# Patient Record
Sex: FEMALE | Race: WHITE | Employment: OTHER | URBAN - NONMETROPOLITAN AREA
[De-identification: names, ages, dates, MRNs, and addresses within clinical notes are randomized per-mention and may not be internally consistent; named-entity substitution may affect disease eponyms.]

---

## 2017-01-04 ENCOUNTER — APPOINTMENT (OUTPATIENT)
Dept: LAB | Age: 52
End: 2017-01-04
Attending: FAMILY MEDICINE
Payer: MEDICARE

## 2017-01-04 ENCOUNTER — OFFICE VISIT (OUTPATIENT)
Dept: FAMILY MEDICINE CLINIC | Facility: CLINIC | Age: 52
End: 2017-01-04

## 2017-01-04 VITALS
OXYGEN SATURATION: 97 % | TEMPERATURE: 98 F | DIASTOLIC BLOOD PRESSURE: 58 MMHG | HEART RATE: 70 BPM | SYSTOLIC BLOOD PRESSURE: 110 MMHG

## 2017-01-04 DIAGNOSIS — Z79.4 UNCONTROLLED TYPE 2 DIABETES MELLITUS WITH MICROALBUMINURIA, WITH LONG-TERM CURRENT USE OF INSULIN (HCC): ICD-10-CM

## 2017-01-04 DIAGNOSIS — E11.29 UNCONTROLLED TYPE 2 DIABETES MELLITUS WITH MICROALBUMINURIA, WITH LONG-TERM CURRENT USE OF INSULIN (HCC): Primary | ICD-10-CM

## 2017-01-04 DIAGNOSIS — R80.9 UNCONTROLLED TYPE 2 DIABETES MELLITUS WITH MICROALBUMINURIA, WITH LONG-TERM CURRENT USE OF INSULIN (HCC): ICD-10-CM

## 2017-01-04 DIAGNOSIS — L97.909 VENOUS STASIS ULCER (HCC): ICD-10-CM

## 2017-01-04 DIAGNOSIS — I10 ESSENTIAL HYPERTENSION: ICD-10-CM

## 2017-01-04 DIAGNOSIS — E11.65 UNCONTROLLED TYPE 2 DIABETES MELLITUS WITH MICROALBUMINURIA, WITH LONG-TERM CURRENT USE OF INSULIN (HCC): ICD-10-CM

## 2017-01-04 DIAGNOSIS — I83.009 VENOUS STASIS ULCER (HCC): ICD-10-CM

## 2017-01-04 DIAGNOSIS — J32.9 SINUSITIS, UNSPECIFIED CHRONICITY, UNSPECIFIED LOCATION: ICD-10-CM

## 2017-01-04 DIAGNOSIS — E11.65 UNCONTROLLED TYPE 2 DIABETES MELLITUS WITH MICROALBUMINURIA, UNSPECIFIED LONG TERM INSULIN USE STATUS: ICD-10-CM

## 2017-01-04 DIAGNOSIS — E11.65 UNCONTROLLED TYPE 2 DIABETES MELLITUS WITH MICROALBUMINURIA, WITH LONG-TERM CURRENT USE OF INSULIN (HCC): Primary | ICD-10-CM

## 2017-01-04 DIAGNOSIS — E11.29 UNCONTROLLED TYPE 2 DIABETES MELLITUS WITH MICROALBUMINURIA, UNSPECIFIED LONG TERM INSULIN USE STATUS: ICD-10-CM

## 2017-01-04 DIAGNOSIS — R80.9 UNCONTROLLED TYPE 2 DIABETES MELLITUS WITH MICROALBUMINURIA, UNSPECIFIED LONG TERM INSULIN USE STATUS: ICD-10-CM

## 2017-01-04 DIAGNOSIS — E11.29 UNCONTROLLED TYPE 2 DIABETES MELLITUS WITH MICROALBUMINURIA, WITH LONG-TERM CURRENT USE OF INSULIN (HCC): ICD-10-CM

## 2017-01-04 DIAGNOSIS — Z79.4 UNCONTROLLED TYPE 2 DIABETES MELLITUS WITH MICROALBUMINURIA, WITH LONG-TERM CURRENT USE OF INSULIN (HCC): Primary | ICD-10-CM

## 2017-01-04 DIAGNOSIS — R80.9 UNCONTROLLED TYPE 2 DIABETES MELLITUS WITH MICROALBUMINURIA, WITH LONG-TERM CURRENT USE OF INSULIN (HCC): Primary | ICD-10-CM

## 2017-01-04 LAB
ALBUMIN SERPL-MCNC: 2.2 G/DL (ref 3.5–4.8)
ALP LIVER SERPL-CCNC: 115 U/L (ref 39–100)
ALT SERPL-CCNC: 13 U/L (ref 14–54)
AST SERPL-CCNC: 10 U/L (ref 15–41)
BILIRUB SERPL-MCNC: 0.2 MG/DL (ref 0.1–2)
BUN BLD-MCNC: 49 MG/DL (ref 8–20)
CALCIUM BLD-MCNC: 8.6 MG/DL (ref 8.3–10.3)
CHLORIDE: 105 MMOL/L (ref 101–111)
CHOLEST SMN-MCNC: 200 MG/DL (ref ?–200)
CO2: 24 MMOL/L (ref 22–32)
CREAT BLD-MCNC: 2.26 MG/DL (ref 0.55–1.02)
CREAT UR-SCNC: 52.8 MG/DL
EST. AVERAGE GLUCOSE BLD GHB EST-MCNC: 223 MG/DL (ref 68–126)
GLUCOSE BLD-MCNC: 249 MG/DL (ref 70–99)
HBA1C MFR BLD HPLC: 9.4 % (ref ?–5.7)
HDLC SERPL-MCNC: 47 MG/DL (ref 45–?)
HDLC SERPL: 4.26 {RATIO} (ref ?–4.44)
LDLC SERPL CALC-MCNC: 121 MG/DL (ref ?–130)
M PROTEIN MFR SERPL ELPH: 6.7 G/DL (ref 6.1–8.3)
MICROALBUMIN UR-MCNC: 441 MG/DL
MICROALBUMIN/CREAT 24H UR-RTO: 8352.3 UG/MG (ref ?–30)
NONHDLC SERPL-MCNC: 153 MG/DL (ref ?–130)
POTASSIUM SERPL-SCNC: 4.1 MMOL/L (ref 3.6–5.1)
SODIUM SERPL-SCNC: 137 MMOL/L (ref 136–144)
TRIGLYCERIDES: 161 MG/DL (ref ?–150)
VLDL: 32 MG/DL (ref 5–40)

## 2017-01-04 PROCEDURE — 36415 COLL VENOUS BLD VENIPUNCTURE: CPT | Performed by: FAMILY MEDICINE

## 2017-01-04 PROCEDURE — 99214 OFFICE O/P EST MOD 30 MIN: CPT | Performed by: FAMILY MEDICINE

## 2017-01-04 PROCEDURE — 36415 COLL VENOUS BLD VENIPUNCTURE: CPT

## 2017-01-04 PROCEDURE — 80053 COMPREHEN METABOLIC PANEL: CPT

## 2017-01-04 PROCEDURE — 80061 LIPID PANEL: CPT

## 2017-01-04 PROCEDURE — 83036 HEMOGLOBIN GLYCOSYLATED A1C: CPT

## 2017-01-04 PROCEDURE — 82570 ASSAY OF URINE CREATININE: CPT

## 2017-01-04 PROCEDURE — 82043 UR ALBUMIN QUANTITATIVE: CPT

## 2017-01-04 RX ORDER — LEVOFLOXACIN 500 MG/1
500 TABLET, FILM COATED ORAL DAILY
Qty: 10 TABLET | Refills: 0 | Status: SHIPPED | OUTPATIENT
Start: 2017-01-04 | End: 2017-01-14

## 2017-01-04 NOTE — PROGRESS NOTES
Cain Severs is a 49year old female. Patient presents with: Other: always cold, sore throat, runny nose, light headed, dizziness, congestion in chest (hurts to breath), cough. ...started on and off since 12/24/16. .... room 1      HPI:   Patient compla mouth daily. Disp:  Rfl:    NITROSTAT 0.4 MG Sublingual SL Tab Dissolve one tablet under tongue eery 5 minutes as needed for chest pain Disp:  Rfl:    insulin glargine 100 UNIT/ML Subcutaneous Solution Inject 40 Units into the skin every morning.  Disp: 5 v Family History   Problem Relation Age of Onset   • Diabetes Father    • Other[other] [OTHER] Sister      ANGLE   • Psychiatric Brother      suicide        Social History:    Smoking Status: Former Smoker                   Packs/Day: 1.00  Years: 21 osteomyelitis. She may need to have a below the knee amputation. Also encouraged her to try Mucinex for the sinus congestion. We will check her diabetic labs today. She has had elevated microalbumin. We will repeat that today as well.   She may need a

## 2017-01-05 NOTE — PROGRESS NOTES
Quick Note:    Notify kidney function is declining. Need to collect a 24-hour urine for protein. Hemoglobin A1c is elevated. Need to record blood sugars fasting every morning 2 hours after lunch and 2 hours after dinner.  I need to see the results every wee

## 2017-01-06 ENCOUNTER — APPOINTMENT (OUTPATIENT)
Dept: LAB | Age: 52
End: 2017-01-06
Attending: FAMILY MEDICINE
Payer: MEDICARE

## 2017-01-06 DIAGNOSIS — N28.9 RENAL INSUFFICIENCY: ICD-10-CM

## 2017-01-06 LAB
M PROTEIN 24H UR ELPH-MRATE: ABNORMAL MG/24 HR (ref ?–100)
SPECIMEN VOL UR: 2600 ML

## 2017-01-06 PROCEDURE — 84156 ASSAY OF PROTEIN URINE: CPT

## 2017-01-07 NOTE — PROGRESS NOTES
Quick Note:    Notify 24-hour urine shows excessive protein. Roman Pinon needs to be seen by nephrology.  Recommend Dr. João Segura  ______

## 2017-01-08 ENCOUNTER — CHARTING TRANS (OUTPATIENT)
Dept: OTHER | Age: 52
End: 2017-01-08

## 2017-01-08 ENCOUNTER — LAB SERVICES (OUTPATIENT)
Dept: OTHER | Age: 52
End: 2017-01-08

## 2017-01-09 ENCOUNTER — CHARTING TRANS (OUTPATIENT)
Dept: OTHER | Age: 52
End: 2017-01-09

## 2017-01-10 ENCOUNTER — CHARTING TRANS (OUTPATIENT)
Dept: OTHER | Age: 52
End: 2017-01-10

## 2017-01-13 RX ORDER — INSULIN ASPART 100 [IU]/ML
20 INJECTION, SOLUTION INTRAVENOUS; SUBCUTANEOUS
Qty: 6 VIAL | Refills: 0 | Status: SHIPPED | OUTPATIENT
Start: 2017-01-13 | End: 2017-03-21

## 2017-01-14 LAB — CULTURE BLOOD: NORMAL

## 2017-01-20 ENCOUNTER — LAB SERVICES (OUTPATIENT)
Dept: OTHER | Age: 52
End: 2017-01-20

## 2017-01-20 LAB
ANION GAP: 13
BLOOD UREA NITROGEN (BUN): 43 MG/DL
BUN/CREATININE RATIO: 19
CALCIUM, SERUM: 9.2 MG/DL
CARBON DIOXIDE: 24 MMOL/L
CHLORIDE, SERUM: 109 MMOL/L
CREATININE: 2.3 MG/DL
EGFR AFRICAN-AMERICAN: 27.8
GLUCOSE: 112 MG/DL
K (POTASSIUM, SERUM): 4.4 MMOL/L
NA (SODIUM, SERUM): 146 MMOL/L

## 2017-01-23 ENCOUNTER — CHARTING TRANS (OUTPATIENT)
Dept: NEPHROLOGY | Age: 52
End: 2017-01-23

## 2017-01-25 ENCOUNTER — CHARTING TRANS (OUTPATIENT)
Dept: OTHER | Age: 52
End: 2017-01-25

## 2017-01-25 ENCOUNTER — TELEPHONE (OUTPATIENT)
Dept: FAMILY MEDICINE CLINIC | Facility: CLINIC | Age: 52
End: 2017-01-25

## 2017-01-25 NOTE — TELEPHONE ENCOUNTER
CALLING TO GET VERBAL ORDER FOR MEDICAL SOCIAL WORKER,   ALSO PT HAS MULTIPLE ULCERS ON LT LOWER LEG, HOW DO THEY GO ABOUT GETTING COMPRESSION STOCKINGS? [id number] for ?    I will advise that the patient has to see  here in the office in order

## 2017-01-25 NOTE — TELEPHONE ENCOUNTER
Calling- Marah Devi and gave her the verbal order for the   She will have the patient call and schedule an appointment with Dr Harvinder Banks

## 2017-02-07 ENCOUNTER — TELEPHONE (OUTPATIENT)
Dept: FAMILY MEDICINE CLINIC | Facility: CLINIC | Age: 52
End: 2017-02-07

## 2017-02-07 NOTE — TELEPHONE ENCOUNTER
Roxann Dorman 336 -   She is in need for incontinent supplies- she just needed a diagnosis other than amputee;   I adv that the patient has DM II- uncontrolled- ulcers, etc.     They will be faxing over order for  to sign

## 2017-02-20 ENCOUNTER — LAB SERVICES (OUTPATIENT)
Dept: OTHER | Age: 52
End: 2017-02-20

## 2017-02-20 LAB
25(OH)D3 SERPL-MCNC: <12.8 NG/ML (ref 30–100)
BUN SERPL-MCNC: 39 MG/DL (ref 7–20)
CALCIUM SERPL-MCNC: 8.9 MG/DL (ref 8.6–10.6)
CHLORIDE SERPL-SCNC: 104 MMOL/L (ref 96–107)
CHOLEST SERPL-MCNC: 203 MG/DL (ref 140–200)
CREATININE, SERUM: 2 MG/DL (ref 0.5–1.4)
EST. AVERAGE GLUCOSE BLD GHB EST-MCNC: 226 MG/DL (ref 0–154)
FERRITIN SERPL-MCNC: 50 NG/ML (ref 11–264)
GFR SERPL CREATININE-BSD FRML MDRD: 26 ML/MIN/{1.73M2}
GFR SERPL CREATININE-BSD FRML MDRD: 32 ML/MIN/{1.73M2}
GLUCOSE SERPL-MCNC: 137 MG/DL (ref 70–200)
HBA1C MFR BLD: 9.5 % (ref 4.2–6)
HCO3 SERPL-SCNC: 25 MMOL/L (ref 22–32)
HDLC SERPL-MCNC: 42 MG/DL
HEMATOCRIT: 30.7 % (ref 34–45)
HEMOGLOBIN: 10.1 G/DL (ref 11.2–15.7)
IRON SATN MFR SERPL: 19 % (ref 9–55)
IRON SERPL-MCNC: 46 UG/DL (ref 37–170)
LDLC SERPL CALC-MCNC: 117 MG/DL (ref 30–100)
PHOSPHATE SERPL-MCNC: 3.9 MG/DL (ref 2.5–4.9)
POTASSIUM SERPL-SCNC: 4.3 MMOL/L (ref 3.5–5.3)
SODIUM SERPL-SCNC: 137 MMOL/L (ref 136–146)
TIBC SERPL-MCNC: 239 UG/DL (ref 250–450)
TRIGL SERPL-MCNC: 222 MG/DL (ref 0–200)

## 2017-02-20 RX ORDER — NIFEDIPINE 60 MG/1
TABLET, FILM COATED, EXTENDED RELEASE ORAL
Qty: 30 TABLET | Refills: 0 | Status: SHIPPED | OUTPATIENT
Start: 2017-02-20 | End: 2017-03-20

## 2017-02-21 ENCOUNTER — OFFICE VISIT (OUTPATIENT)
Dept: FAMILY MEDICINE CLINIC | Facility: CLINIC | Age: 52
End: 2017-02-21

## 2017-02-21 ENCOUNTER — CHARTING TRANS (OUTPATIENT)
Dept: OTHER | Age: 52
End: 2017-02-21

## 2017-02-21 VITALS
TEMPERATURE: 98 F | HEART RATE: 64 BPM | DIASTOLIC BLOOD PRESSURE: 66 MMHG | OXYGEN SATURATION: 95 % | SYSTOLIC BLOOD PRESSURE: 138 MMHG

## 2017-02-21 DIAGNOSIS — N18.4 CKD (CHRONIC KIDNEY DISEASE) STAGE 4, GFR 15-29 ML/MIN (HCC): ICD-10-CM

## 2017-02-21 DIAGNOSIS — E11.21 UNCONTROLLED TYPE II DIABETES MELLITUS WITH NEPHROPATHY (HCC): ICD-10-CM

## 2017-02-21 DIAGNOSIS — E11.65 UNCONTROLLED TYPE II DIABETES MELLITUS WITH NEPHROPATHY (HCC): ICD-10-CM

## 2017-02-21 DIAGNOSIS — J40 BRONCHITIS: Primary | ICD-10-CM

## 2017-02-21 LAB
CREAT ?TM UR-MCNC: 40 MG/DL
PROT ?TM UR-MCNC: 388 MG/DL
PROT/CREAT UR: 9700 MGPR/GCR

## 2017-02-21 PROCEDURE — 99214 OFFICE O/P EST MOD 30 MIN: CPT | Performed by: FAMILY MEDICINE

## 2017-02-21 NOTE — PROGRESS NOTES
Stacey Thompson is a 49year old female. Patient presents with: Other: head/chest congestion, slight cough, ears feel plugged. ...started approx 2/19/17. .. Tres Rubio has not tried anything OTC. ...room 1      HPI:   Patient complains of cough, congestion, ears feel Disp: 5 vial Rfl: 1   Glucose Blood (ONETOUCH ULTRA BLUE) In Vitro Strip USE ONE  THREE TIMES DAILY Disp: 300 each Rfl: 1   Insulin Syringe (RELION INSULIN SYRINGE) 29G X 1/2\" 1 ML Does not apply Misc Use up to 4 times daily Disp: 360 each Rfl: prn   ONET Used                        Alcohol Use: No                 Comment: rare       REVIEW OF SYSTEMS:   GENERAL HEALTH: feels well otherwise  SKIN: denies any unusual skin lesions or rashes  RESPIRATORY: denies shortness of breath   CARDIOVASCULAR: denies evelio Imaging & Consults:  None

## 2017-02-22 ENCOUNTER — TELEPHONE (OUTPATIENT)
Dept: FAMILY MEDICINE CLINIC | Facility: CLINIC | Age: 52
End: 2017-02-22

## 2017-02-22 LAB — PTH-INTACT SERPL-MCNC: 97 PG/ML (ref 14–72)

## 2017-02-22 NOTE — TELEPHONE ENCOUNTER
SUHA WAS WONDERING IF SHE COULD TAKE PEPTO BISMAL WITH THE MEDS SHE IS ON? Calling the patient- she has diarrhea really bad- and it started lat night.  She did not take any MOM - she didn't want Dr to think that she took that causing the diarrhea   She

## 2017-02-22 NOTE — TELEPHONE ENCOUNTER
Imodium would be preferred. Should also start on a probiotic. I usually recommend Florastor which is available over-the-counter.

## 2017-02-24 ENCOUNTER — TELEPHONE (OUTPATIENT)
Dept: FAMILY MEDICINE CLINIC | Facility: CLINIC | Age: 52
End: 2017-02-24

## 2017-02-24 RX ORDER — CEFDINIR 300 MG/1
300 CAPSULE ORAL 2 TIMES DAILY
Qty: 20 CAPSULE | Refills: 0 | Status: SHIPPED | OUTPATIENT
Start: 2017-02-24 | End: 2017-03-06

## 2017-02-24 NOTE — TELEPHONE ENCOUNTER
Calling the patient-     She is not feeling any better- she was told to call and  mentioned calling something in for her  She still has the cough- not coughing anything up- worse at night  No fever; her chest feels congested- making it hard to breathe

## 2017-02-27 ENCOUNTER — CHARTING TRANS (OUTPATIENT)
Dept: NEPHROLOGY | Age: 52
End: 2017-02-27

## 2017-03-01 ENCOUNTER — CHARTING TRANS (OUTPATIENT)
Dept: OTHER | Age: 52
End: 2017-03-01

## 2017-03-03 ENCOUNTER — CHARTING TRANS (OUTPATIENT)
Dept: OTHER | Age: 52
End: 2017-03-03

## 2017-03-07 ENCOUNTER — TELEPHONE (OUTPATIENT)
Dept: FAMILY MEDICINE CLINIC | Facility: CLINIC | Age: 52
End: 2017-03-07

## 2017-03-07 ENCOUNTER — CHARTING TRANS (OUTPATIENT)
Dept: OTHER | Age: 52
End: 2017-03-07

## 2017-03-07 NOTE — TELEPHONE ENCOUNTER
she has a cardio doctor thru [facility] and not very happy with him, she would like a referral.     I called the patient - she advised that every appointment she makes with the cardiologist they end up cancelling with her and rescheduling.  They have cancelled

## 2017-03-13 ENCOUNTER — PATIENT OUTREACH (OUTPATIENT)
Dept: FAMILY MEDICINE CLINIC | Facility: CLINIC | Age: 52
End: 2017-03-13

## 2017-03-14 ENCOUNTER — CHARTING TRANS (OUTPATIENT)
Dept: OTHER | Age: 52
End: 2017-03-14

## 2017-03-15 RX ORDER — NITROGLYCERIN 0.4 MG/1
TABLET SUBLINGUAL
Qty: 20 TABLET | Refills: 0
Start: 2017-03-15 | End: 2017-08-03

## 2017-03-15 NOTE — TELEPHONE ENCOUNTER
Patient is asking for a refill on her Nitrostat.  We have not filled this before so I am not sure of the quantity

## 2017-03-19 RX ORDER — NIFEDIPINE 60 MG/1
TABLET, FILM COATED, EXTENDED RELEASE ORAL
Qty: 30 TABLET | Refills: 0 | Status: CANCELLED | OUTPATIENT
Start: 2017-03-19

## 2017-03-19 RX ORDER — PANTOPRAZOLE SODIUM 40 MG/1
TABLET, DELAYED RELEASE ORAL
Qty: 60 TABLET | Refills: 0 | Status: CANCELLED | OUTPATIENT
Start: 2017-03-19

## 2017-03-19 RX ORDER — CARVEDILOL 12.5 MG/1
TABLET ORAL
Qty: 60 TABLET | Refills: 0 | Status: CANCELLED | OUTPATIENT
Start: 2017-03-19

## 2017-03-20 ENCOUNTER — CHARTING TRANS (OUTPATIENT)
Dept: OTHER | Age: 52
End: 2017-03-20

## 2017-03-20 ENCOUNTER — TELEPHONE (OUTPATIENT)
Dept: FAMILY MEDICINE CLINIC | Facility: CLINIC | Age: 52
End: 2017-03-20

## 2017-03-20 RX ORDER — PANTOPRAZOLE SODIUM 40 MG/1
40 TABLET, DELAYED RELEASE ORAL 2 TIMES DAILY
Qty: 60 TABLET | Refills: 1 | Status: SHIPPED | OUTPATIENT
Start: 2017-03-20 | End: 2017-10-09

## 2017-03-20 RX ORDER — CARVEDILOL 12.5 MG/1
12.5 TABLET ORAL 2 TIMES DAILY
Qty: 60 TABLET | Refills: 1 | Status: SHIPPED | OUTPATIENT
Start: 2017-03-20 | End: 2017-10-09

## 2017-03-20 RX ORDER — NIFEDIPINE 60 MG/1
TABLET, FILM COATED, EXTENDED RELEASE ORAL
Qty: 30 TABLET | Refills: 3 | Status: SHIPPED | OUTPATIENT
Start: 2017-03-20 | End: 2017-08-06

## 2017-03-20 NOTE — TELEPHONE ENCOUNTER
med called has a conflict with her asthma?  PLease call [facility] back    I called in Carvedilol, Nifedipine, Pantoprazole   Are these ok still for her to take?

## 2017-03-21 DIAGNOSIS — Z87.39 HX OF NECROTIZING FASCIITIS: Primary | ICD-10-CM

## 2017-03-21 RX ORDER — INSULIN ASPART 100 [IU]/ML
20 INJECTION, SOLUTION INTRAVENOUS; SUBCUTANEOUS
Qty: 6 VIAL | Refills: 0 | Status: SHIPPED | OUTPATIENT
Start: 2017-03-21 | End: 2017-10-23

## 2017-03-21 RX ORDER — IBUPROFEN 200 MG
TABLET ORAL
Qty: 360 EACH | Status: SHIPPED | OUTPATIENT
Start: 2017-03-21 | End: 2018-10-04

## 2017-03-21 RX ORDER — INSULIN GLARGINE 100 [IU]/ML
INJECTION, SOLUTION SUBCUTANEOUS
Qty: 50 ML | Refills: 0 | Status: SHIPPED | OUTPATIENT
Start: 2017-03-21 | End: 2017-06-22

## 2017-03-28 ENCOUNTER — CHARTING TRANS (OUTPATIENT)
Dept: OTHER | Age: 52
End: 2017-03-28

## 2017-04-04 ENCOUNTER — CHARTING TRANS (OUTPATIENT)
Dept: OTHER | Age: 52
End: 2017-04-04

## 2017-04-04 ASSESSMENT — PAIN SCALES - GENERAL: PAINLEVEL_OUTOF10: 0

## 2017-04-06 ENCOUNTER — MED REC SCAN ONLY (OUTPATIENT)
Dept: FAMILY MEDICINE CLINIC | Facility: CLINIC | Age: 52
End: 2017-04-06

## 2017-04-27 ENCOUNTER — TELEPHONE (OUTPATIENT)
Dept: FAMILY MEDICINE CLINIC | Facility: CLINIC | Age: 52
End: 2017-04-27

## 2017-04-28 NOTE — TELEPHONE ENCOUNTER
MECHE:     Ariella, she just wants you to know that Stephanie told her that she is not ready to make an appointment with Dr Torsten Valencia yet and Africa Pinon has tried many times with her    Will forward to Dr Lavon Abrams.  This patient called us requesting a new cardiologist

## 2017-06-05 RX ORDER — FUROSEMIDE 40 MG/1
TABLET ORAL
Qty: 90 TABLET | Refills: 0 | Status: SHIPPED | OUTPATIENT
Start: 2017-06-05 | End: 2017-10-22

## 2017-06-05 NOTE — TELEPHONE ENCOUNTER
Furosemide 40 mg #90/0  Pt requesting 20 mg tabs since pharmacy does not have 40 mg tab  Last OV 1/04/17  Last refill 12/19/16  Last CMP 1/04/17

## 2017-06-16 ENCOUNTER — CHARTING TRANS (OUTPATIENT)
Dept: OTHER | Age: 52
End: 2017-06-16

## 2017-06-17 ENCOUNTER — LAB SERVICES (OUTPATIENT)
Dept: OTHER | Age: 52
End: 2017-06-17

## 2017-06-17 ENCOUNTER — CHARTING TRANS (OUTPATIENT)
Dept: OTHER | Age: 52
End: 2017-06-17

## 2017-06-18 LAB — CULTURE URINE: ABNORMAL

## 2017-06-19 ENCOUNTER — CHARTING TRANS (OUTPATIENT)
Dept: OTHER | Age: 52
End: 2017-06-19

## 2017-06-22 ENCOUNTER — OFFICE VISIT (OUTPATIENT)
Dept: FAMILY MEDICINE CLINIC | Facility: CLINIC | Age: 52
End: 2017-06-22

## 2017-06-22 VITALS
HEART RATE: 74 BPM | OXYGEN SATURATION: 98 % | SYSTOLIC BLOOD PRESSURE: 124 MMHG | DIASTOLIC BLOOD PRESSURE: 70 MMHG | TEMPERATURE: 98 F

## 2017-06-22 DIAGNOSIS — E11.29 UNCONTROLLED TYPE 2 DIABETES MELLITUS WITH MICROALBUMINURIA, WITH LONG-TERM CURRENT USE OF INSULIN (HCC): Primary | ICD-10-CM

## 2017-06-22 DIAGNOSIS — Z79.4 UNCONTROLLED TYPE 2 DIABETES MELLITUS WITH MICROALBUMINURIA, WITH LONG-TERM CURRENT USE OF INSULIN (HCC): Primary | ICD-10-CM

## 2017-06-22 DIAGNOSIS — E11.65 UNCONTROLLED TYPE 2 DIABETES MELLITUS WITH MICROALBUMINURIA, WITH LONG-TERM CURRENT USE OF INSULIN (HCC): Primary | ICD-10-CM

## 2017-06-22 DIAGNOSIS — R80.9 UNCONTROLLED TYPE 2 DIABETES MELLITUS WITH MICROALBUMINURIA, WITH LONG-TERM CURRENT USE OF INSULIN (HCC): Primary | ICD-10-CM

## 2017-06-22 DIAGNOSIS — G47.33 OSA (OBSTRUCTIVE SLEEP APNEA): ICD-10-CM

## 2017-06-22 DIAGNOSIS — J44.9 CHRONIC OBSTRUCTIVE PULMONARY DISEASE, UNSPECIFIED COPD TYPE (HCC): ICD-10-CM

## 2017-06-22 PROCEDURE — 94010 BREATHING CAPACITY TEST: CPT | Performed by: FAMILY MEDICINE

## 2017-06-22 PROCEDURE — 99214 OFFICE O/P EST MOD 30 MIN: CPT | Performed by: FAMILY MEDICINE

## 2017-06-22 NOTE — PROGRESS NOTES
Laila Rodriguez is a 47year old female. Patient presents with: Other: HOSPITAL F/U --- CHEST PAIN-- [facility] JAC--- RM 1      HPI:   Patient was hospitalized at [facility] with chest pain. Cardiac workup was negative. We do not have the results.   Patient lungs 2 (two) times daily. Rinse mouth each time after using Disp: 1 Inhaler Rfl: prn   Isosorbide Mononitrate ER 30 MG Oral Tablet 24 Hr Take 1 tablet (30 mg total) by mouth daily.  Disp: 30 tablet Rfl: 1   Oxybutynin Chloride 5 MG Oral Tab Take 1 tablet ( to anti-inflammatories   • Uncontrolled type II diabetes mellitus with nephropathy ([facility] [address] 11/30/2016   • CKD (chronic kidney disease) stage 4, GFR 15-29 ml/min ([facility])          Past Surgical History    AMPUTATION FOOT,MIDTARSAL-CHOPART      ANGIOPLASTY ([place] so we can adjust the insulin. Explained to her that she is critically close to dialysis. Her best chance to avoid it is getting her sugar under control. I will also refer her to diabetic education. Sleep study will be ordered.   Her glucometer is no indira

## 2017-06-26 ENCOUNTER — CHARTING TRANS (OUTPATIENT)
Dept: CARDIOLOGY | Age: 52
End: 2017-06-26

## 2017-06-26 ENCOUNTER — LAB SERVICES (OUTPATIENT)
Dept: OTHER | Age: 52
End: 2017-06-26

## 2017-06-26 ENCOUNTER — TELEPHONE (OUTPATIENT)
Dept: FAMILY MEDICINE CLINIC | Facility: CLINIC | Age: 52
End: 2017-06-26

## 2017-06-26 ENCOUNTER — IMAGING SERVICES (OUTPATIENT)
Dept: OTHER | Age: 52
End: 2017-06-26

## 2017-06-26 LAB
25(OH)D3 SERPL-MCNC: <12.8 NG/ML (ref 30–100)
ALBUMIN SERPL BCG-MCNC: 2.7 G/DL (ref 3.6–5.1)
ALP SERPL-CCNC: 107 U/L (ref 45–105)
ALT SERPL W/O P-5'-P-CCNC: 18 U/L (ref 15–43)
AST SERPL-CCNC: 8 U/L (ref 14–43)
BILIRUB DIRECT SERPL-MCNC: 0 MG/DL (ref 0–0.3)
BILIRUB SERPL-MCNC: 0.4 MG/DL (ref 0–1.3)
BUN SERPL-MCNC: 49 MG/DL (ref 7–20)
CALCIUM SERPL-MCNC: 8.9 MG/DL (ref 8.6–10.6)
CALCIUM SERPL-MCNC: 8.9 MG/DL (ref 8.6–10.6)
CHLORIDE SERPL-SCNC: 108 MMOL/L (ref 96–107)
CHOLEST SERPL-MCNC: 244 MG/DL (ref 140–200)
CREATININE, SERUM: 3.1 MG/DL (ref 0.5–1.4)
FERRITIN SERPL-MCNC: 84 NG/ML (ref 11–264)
GFR SERPL CREATININE-BSD FRML MDRD: 16 ML/MIN/{1.73M2}
GFR SERPL CREATININE-BSD FRML MDRD: 19 ML/MIN/{1.73M2}
GLUCOSE SERPL-MCNC: 215 MG/DL (ref 70–200)
HCO3 SERPL-SCNC: 23 MMOL/L (ref 22–32)
HDLC SERPL-MCNC: 43 MG/DL
HEMATOCRIT: 32.3 % (ref 34–45)
HEMOGLOBIN: 10.8 G/DL (ref 11.2–15.7)
IRON SATN MFR SERPL: 21 % (ref 9–55)
IRON SERPL-MCNC: 46 UG/DL (ref 37–170)
PHOSPHATE SERPL-MCNC: 5.3 MG/DL (ref 2.5–4.9)
POTASSIUM SERPL-SCNC: 4.3 MMOL/L (ref 3.5–5.3)
PROT SERPL-MCNC: 5.9 G/DL (ref 6.4–8.5)
PTH-INTACT SERPL-MCNC: 79.7 PG/ML (ref 23–73)
SODIUM SERPL-SCNC: 141 MMOL/L (ref 136–146)
TIBC SERPL-MCNC: 222 UG/DL (ref 250–450)
TRIGL SERPL-MCNC: 277 MG/DL (ref 0–200)

## 2017-06-26 NOTE — TELEPHONE ENCOUNTER
Looking for test results/ notes from a lung capacity procedure/test.  Possible PSP. Patient had told them she had it done at our office.    Please fax to Jennifer Villavicencio at [phone]

## 2017-06-27 RX ORDER — BLOOD-GLUCOSE METER
EACH MISCELLANEOUS
Qty: 1 KIT | Refills: 0 | Status: SHIPPED | OUTPATIENT
Start: 2017-06-27 | End: 2017-07-03

## 2017-06-29 ENCOUNTER — CHARTING TRANS (OUTPATIENT)
Dept: NEPHROLOGY | Age: 52
End: 2017-06-29

## 2017-07-03 ENCOUNTER — MED REC SCAN ONLY (OUTPATIENT)
Dept: FAMILY MEDICINE CLINIC | Facility: CLINIC | Age: 52
End: 2017-07-03

## 2017-07-03 RX ORDER — BLOOD-GLUCOSE METER
1 EACH MISCELLANEOUS 3 TIMES DAILY
Qty: 1 KIT | Refills: 0 | Status: SHIPPED | OUTPATIENT
Start: 2017-07-03 | End: 2018-10-19

## 2017-07-11 ENCOUNTER — CHARTING TRANS (OUTPATIENT)
Dept: OTHER | Age: 52
End: 2017-07-11

## 2017-07-24 ENCOUNTER — CHARTING TRANS (OUTPATIENT)
Dept: OTHER | Age: 52
End: 2017-07-24

## 2017-07-24 LAB
AMB EXT CREATININE: 3.51 MG/DL
HCT: 25.5 %
HGB: 8.9 G/DL (ref 12–16)

## 2017-07-25 ENCOUNTER — CHARTING TRANS (OUTPATIENT)
Dept: OTHER | Age: 52
End: 2017-07-25

## 2017-07-27 ENCOUNTER — CHARTING TRANS (OUTPATIENT)
Dept: OTHER | Age: 52
End: 2017-07-27

## 2017-08-03 ENCOUNTER — OFFICE VISIT (OUTPATIENT)
Dept: FAMILY MEDICINE CLINIC | Facility: CLINIC | Age: 52
End: 2017-08-03

## 2017-08-03 VITALS
OXYGEN SATURATION: 94 % | DIASTOLIC BLOOD PRESSURE: 62 MMHG | TEMPERATURE: 99 F | WEIGHT: 293 LBS | HEIGHT: 72 IN | HEART RATE: 61 BPM | SYSTOLIC BLOOD PRESSURE: 158 MMHG | BODY MASS INDEX: 39.68 KG/M2

## 2017-08-03 DIAGNOSIS — N18.4 CKD (CHRONIC KIDNEY DISEASE) STAGE 4, GFR 15-29 ML/MIN (HCC): ICD-10-CM

## 2017-08-03 DIAGNOSIS — E11.65 UNCONTROLLED TYPE II DIABETES MELLITUS WITH NEPHROPATHY (HCC): ICD-10-CM

## 2017-08-03 DIAGNOSIS — Z79.4 UNCONTROLLED TYPE 2 DIABETES MELLITUS WITH MICROALBUMINURIA, WITH LONG-TERM CURRENT USE OF INSULIN (HCC): ICD-10-CM

## 2017-08-03 DIAGNOSIS — E11.21 UNCONTROLLED TYPE II DIABETES MELLITUS WITH NEPHROPATHY (HCC): ICD-10-CM

## 2017-08-03 DIAGNOSIS — E11.29 UNCONTROLLED TYPE 2 DIABETES MELLITUS WITH MICROALBUMINURIA, WITH LONG-TERM CURRENT USE OF INSULIN (HCC): ICD-10-CM

## 2017-08-03 DIAGNOSIS — Z89.511 HX OF RIGHT BKA (HCC): Primary | ICD-10-CM

## 2017-08-03 DIAGNOSIS — R80.9 UNCONTROLLED TYPE 2 DIABETES MELLITUS WITH MICROALBUMINURIA, WITH LONG-TERM CURRENT USE OF INSULIN (HCC): ICD-10-CM

## 2017-08-03 DIAGNOSIS — E11.65 UNCONTROLLED TYPE 2 DIABETES MELLITUS WITH MICROALBUMINURIA, WITH LONG-TERM CURRENT USE OF INSULIN (HCC): ICD-10-CM

## 2017-08-03 PROCEDURE — 99214 OFFICE O/P EST MOD 30 MIN: CPT | Performed by: FAMILY MEDICINE

## 2017-08-03 RX ORDER — NITROGLYCERIN 0.6 MG/1
0.6 TABLET SUBLINGUAL EVERY 5 MIN PRN
COMMUNITY
Start: 2017-07-27 | End: 2018-05-16 | Stop reason: ALTCHOICE

## 2017-08-03 RX ORDER — TICAGRELOR 90 MG/1
90 TABLET ORAL 2 TIMES DAILY
COMMUNITY
Start: 2017-06-11

## 2017-08-03 RX ORDER — MELATONIN
325
COMMUNITY
End: 2017-11-17 | Stop reason: ALTCHOICE

## 2017-08-03 RX ORDER — CEPHALEXIN 500 MG/1
500 CAPSULE ORAL 2 TIMES DAILY
COMMUNITY
Start: 2017-07-27 | End: 2017-08-06

## 2017-08-03 RX ORDER — HYDROCODONE BITARTRATE AND ACETAMINOPHEN 5; 325 MG/1; MG/1
TABLET ORAL
COMMUNITY
Start: 2017-07-27 | End: 2018-02-19 | Stop reason: ALTCHOICE

## 2017-08-03 RX ORDER — CETIRIZINE HYDROCHLORIDE 10 MG/1
10 TABLET ORAL DAILY
COMMUNITY
Start: 2017-07-27 | End: 2017-11-17 | Stop reason: ALTCHOICE

## 2017-08-03 RX ORDER — SENNA AND DOCUSATE SODIUM 50; 8.6 MG/1; MG/1
2 TABLET, FILM COATED ORAL 2 TIMES DAILY
COMMUNITY
End: 2017-11-17 | Stop reason: ALTCHOICE

## 2017-08-03 NOTE — PROGRESS NOTES
Chelo Perez is a 47year old female. Patient presents with: Other: [facility] in sunday 23rd out 28th  inrm 1      HPI:   Patient was hospitalized at [facility] July 23-28.   She had a another infection in her stump and required IV an Rfl: 0   Isosorbide Mononitrate ER 30 MG Oral Tablet 24 Hr Take 1 tablet (30 mg total) by mouth daily. Disp: 30 tablet Rfl: 1   Oxybutynin Chloride 5 MG Oral Tab Take 1 tablet (5 mg total) by mouth 2 (two) times daily.  Disp: 60 tablet Rfl: prn   Atorvastat (CORONARY)  Family History   Problem Relation Age of Onset   • Diabetes Father    • Other[other] [OTHER] Sister      ANGLE   • Psychiatric Brother      suicide        Social History:  Smoking status: Former Smoker elevation is from an acute injury and it will recover. Hopefully controlling her sugars will also help. She will continue to follow with nephrology. No orders of the defined types were placed in this encounter.       Meds & Refills for this Visit:    N

## 2017-08-07 ENCOUNTER — LAB SERVICES (OUTPATIENT)
Dept: OTHER | Age: 52
End: 2017-08-07

## 2017-08-07 LAB
ALBUMIN SERPL BCG-MCNC: 2.6 G/DL (ref 3.6–5.1)
ALP SERPL-CCNC: 88 U/L (ref 45–105)
ALT SERPL W/O P-5'-P-CCNC: 11 U/L (ref 15–43)
AST SERPL-CCNC: 11 U/L (ref 14–43)
BILIRUB DIRECT SERPL-MCNC: 0 MG/DL (ref 0–0.3)
BILIRUB SERPL-MCNC: 0.5 MG/DL (ref 0–1.3)
BUN SERPL-MCNC: 53 MG/DL (ref 7–20)
CALCIUM SERPL-MCNC: 8.6 MG/DL (ref 8.6–10.6)
CHLORIDE SERPL-SCNC: 113 MMOL/L (ref 96–107)
CHOLEST SERPL-MCNC: 180 MG/DL (ref 140–200)
CREAT UR-MCNC: 63.4 MG/DL (ref 30–125)
CREATININE, SERUM: 3.3 MG/DL (ref 0.5–1.4)
FERRITIN SERPL-MCNC: 78 NG/ML (ref 11–264)
GFR SERPL CREATININE-BSD FRML MDRD: 15 ML/MIN/{1.73M2}
GFR SERPL CREATININE-BSD FRML MDRD: 18 ML/MIN/{1.73M2}
GLUCOSE SERPL-MCNC: 100 MG/DL (ref 70–200)
HCO3 SERPL-SCNC: 21 MMOL/L (ref 22–32)
HDLC SERPL-MCNC: 33 MG/DL
HEMATOCRIT: 28.2 % (ref 34–45)
HEMOGLOBIN: 8.9 G/DL (ref 11.2–15.7)
IRON SATN MFR SERPL: 29 % (ref 9–55)
IRON SERPL-MCNC: 65 UG/DL (ref 37–170)
LDLC SERPL CALC-MCNC: 100 MG/DL (ref 30–100)
PHOSPHATE SERPL-MCNC: 5.8 MG/DL (ref 2.5–4.9)
POTASSIUM SERPL-SCNC: 4.5 MMOL/L (ref 3.5–5.3)
PROT SERPL-MCNC: 5.7 G/DL (ref 6.4–8.5)
PROT UR-MCNC: >600 MG/DL (ref 0–12)
PROT/CREAT 24H UR: ABNORMAL MG/MG (ref 0–0.2)
SODIUM SERPL-SCNC: 143 MMOL/L (ref 136–146)
TIBC SERPL-MCNC: 221 UG/DL (ref 250–450)
TRIGL SERPL-MCNC: 234 MG/DL (ref 0–200)

## 2017-08-07 RX ORDER — NIFEDIPINE 60 MG/1
TABLET, FILM COATED, EXTENDED RELEASE ORAL
Qty: 30 TABLET | Refills: 3 | Status: SHIPPED | OUTPATIENT
Start: 2017-08-07 | End: 2018-10-19

## 2017-08-11 ENCOUNTER — CHARTING TRANS (OUTPATIENT)
Dept: NEPHROLOGY | Age: 52
End: 2017-08-11

## 2017-08-14 ENCOUNTER — CHARTING TRANS (OUTPATIENT)
Dept: OTHER | Age: 52
End: 2017-08-14

## 2017-08-22 ENCOUNTER — TELEPHONE (OUTPATIENT)
Dept: FAMILY MEDICINE CLINIC | Facility: CLINIC | Age: 52
End: 2017-08-22

## 2017-08-22 NOTE — TELEPHONE ENCOUNTER
Calling Aurora Llanos-    She is having heavy bleeding- it burns when she urinates. It almost is like a period and she has not had one in five years.      They took her to the ED

## 2017-08-23 ENCOUNTER — PATIENT OUTREACH (OUTPATIENT)
Dept: CASE MANAGEMENT | Age: 52
End: 2017-08-23

## 2017-08-25 ENCOUNTER — CHARTING TRANS (OUTPATIENT)
Dept: OTHER | Age: 52
End: 2017-08-25

## 2017-08-30 ENCOUNTER — CHARTING TRANS (OUTPATIENT)
Dept: CARDIOLOGY | Age: 52
End: 2017-08-30

## 2017-08-30 ENCOUNTER — CHARTING TRANS (OUTPATIENT)
Dept: OTHER | Age: 52
End: 2017-08-30

## 2017-09-01 ENCOUNTER — CHARTING TRANS (OUTPATIENT)
Dept: OTHER | Age: 52
End: 2017-09-01

## 2017-09-01 ASSESSMENT — PAIN SCALES - GENERAL: PAINLEVEL_OUTOF10: 0

## 2017-09-08 ENCOUNTER — CHARTING TRANS (OUTPATIENT)
Dept: OTHER | Age: 52
End: 2017-09-08

## 2017-09-08 ASSESSMENT — PAIN SCALES - GENERAL: PAINLEVEL_OUTOF10: 3

## 2017-09-09 ENCOUNTER — LAB SERVICES (OUTPATIENT)
Dept: OTHER | Age: 52
End: 2017-09-09

## 2017-09-09 LAB
BUN SERPL-MCNC: 68 MG/DL (ref 7–20)
CALCIUM SERPL-MCNC: 8.7 MG/DL (ref 8.6–10.6)
CHLORIDE SERPL-SCNC: 112 MMOL/L (ref 96–107)
CREATININE, SERUM: 4.1 MG/DL (ref 0.5–1.4)
GFR SERPL CREATININE-BSD FRML MDRD: 11 ML/MIN/{1.73M2}
GFR SERPL CREATININE-BSD FRML MDRD: 14 ML/MIN/{1.73M2}
GLUCOSE SERPL-MCNC: 203 MG/DL (ref 70–200)
HCO3 SERPL-SCNC: 22 MMOL/L (ref 22–32)
HEMATOCRIT: 28.5 % (ref 34–45)
HEMOGLOBIN: 9.5 G/DL (ref 11.2–15.7)
PHOSPHATE SERPL-MCNC: 6.2 MG/DL (ref 2.5–4.9)
POTASSIUM SERPL-SCNC: 4.5 MMOL/L (ref 3.5–5.3)
SODIUM SERPL-SCNC: 144 MMOL/L (ref 136–146)

## 2017-09-12 ENCOUNTER — CHARTING TRANS (OUTPATIENT)
Dept: NEPHROLOGY | Age: 52
End: 2017-09-12

## 2017-09-14 ENCOUNTER — CHARTING TRANS (OUTPATIENT)
Dept: OTHER | Age: 52
End: 2017-09-14

## 2017-09-22 ENCOUNTER — CHARTING TRANS (OUTPATIENT)
Dept: OTHER | Age: 52
End: 2017-09-22

## 2017-09-29 ENCOUNTER — LAB SERVICES (OUTPATIENT)
Dept: OTHER | Age: 52
End: 2017-09-29

## 2017-09-29 LAB
BUN SERPL-MCNC: 61 MG/DL (ref 7–20)
CALCIUM SERPL-MCNC: 8.1 MG/DL (ref 8.6–10.6)
CHLORIDE SERPL-SCNC: 113 MMOL/L (ref 96–107)
CREATININE, SERUM: 4.6 MG/DL (ref 0.5–1.4)
GFR SERPL CREATININE-BSD FRML MDRD: 10 ML/MIN/{1.73M2}
GFR SERPL CREATININE-BSD FRML MDRD: 12 ML/MIN/{1.73M2}
GLUCOSE SERPL-MCNC: 140 MG/DL (ref 70–200)
HCO3 SERPL-SCNC: 23 MMOL/L (ref 22–32)
HEMATOCRIT: 28 % (ref 34–45)
HEMOGLOBIN: 8.8 G/DL (ref 11.2–15.7)
PHOSPHATE SERPL-MCNC: 7.9 MG/DL (ref 2.5–4.9)
POTASSIUM SERPL-SCNC: 4.8 MMOL/L (ref 3.5–5.3)
SODIUM SERPL-SCNC: 144 MMOL/L (ref 136–146)

## 2017-10-02 ENCOUNTER — LAB SERVICES (OUTPATIENT)
Dept: OTHER | Age: 52
End: 2017-10-02

## 2017-10-02 ENCOUNTER — CHARTING TRANS (OUTPATIENT)
Dept: OTHER | Age: 52
End: 2017-10-02

## 2017-10-02 ENCOUNTER — CHARTING TRANS (OUTPATIENT)
Dept: NEPHROLOGY | Age: 52
End: 2017-10-02

## 2017-10-02 LAB — FAX RESULTS: NORMAL

## 2017-10-03 LAB
ALT SERPL W/O P-5'-P-CCNC: 18 U/L (ref 15–43)
HBV CORE IGG+IGM SER QL: NEGATIVE
HBV SURFACE AB SER QL: NEGATIVE
HBV SURFACE AG SERPL QL IA: NEGATIVE
HCV AB SER QL: NEGATIVE

## 2017-10-04 ENCOUNTER — CHARTING TRANS (OUTPATIENT)
Dept: OTHER | Age: 52
End: 2017-10-04

## 2017-10-08 RX ORDER — CARVEDILOL 12.5 MG/1
TABLET ORAL
Qty: 60 TABLET | Refills: 1 | Status: CANCELLED | OUTPATIENT
Start: 2017-10-08

## 2017-10-08 RX ORDER — PANTOPRAZOLE SODIUM 40 MG/1
TABLET, DELAYED RELEASE ORAL
Qty: 60 TABLET | Refills: 1 | Status: CANCELLED | OUTPATIENT
Start: 2017-10-08

## 2017-10-09 ENCOUNTER — CHARTING TRANS (OUTPATIENT)
Dept: OTHER | Age: 52
End: 2017-10-09

## 2017-10-09 ENCOUNTER — TELEPHONE (OUTPATIENT)
Dept: FAMILY MEDICINE CLINIC | Facility: CLINIC | Age: 52
End: 2017-10-09

## 2017-10-09 RX ORDER — CARVEDILOL 12.5 MG/1
12.5 TABLET ORAL 2 TIMES DAILY
Qty: 60 TABLET | Refills: 1 | Status: SHIPPED | OUTPATIENT
Start: 2017-10-09 | End: 2017-12-03

## 2017-10-09 RX ORDER — PANTOPRAZOLE SODIUM 40 MG/1
40 TABLET, DELAYED RELEASE ORAL 2 TIMES DAILY
Qty: 60 TABLET | Refills: 1 | Status: SHIPPED | OUTPATIENT
Start: 2017-10-09 | End: 2018-05-16

## 2017-10-09 NOTE — TELEPHONE ENCOUNTER
SHE WANTS YOU TO KNOW THAT SHE IS OUT OF THE PANTOPRAZOLE AND THE CARVEDILOL THAT THE PHARMACY SENT OVER YESTERDAY

## 2017-10-23 DIAGNOSIS — Z87.39 HX OF NECROTIZING FASCIITIS: ICD-10-CM

## 2017-10-23 DIAGNOSIS — R10.9 ABDOMINAL PAIN, UNSPECIFIED ABDOMINAL LOCATION: ICD-10-CM

## 2017-10-23 RX ORDER — INSULIN ASPART 100 [IU]/ML
INJECTION, SOLUTION INTRAVENOUS; SUBCUTANEOUS
Qty: 60 ML | Refills: 0 | Status: SHIPPED | OUTPATIENT
Start: 2017-10-23 | End: 2018-04-30

## 2017-10-23 RX ORDER — SYRINGE AND NEEDLE,INSULIN,1ML 30 G X1/2"
SYRINGE, EMPTY DISPOSABLE MISCELLANEOUS
Qty: 300 EACH | Refills: 1 | Status: SHIPPED | OUTPATIENT
Start: 2017-10-23 | End: 2018-07-07

## 2017-10-23 RX ORDER — FUROSEMIDE 40 MG/1
TABLET ORAL
Qty: 90 TABLET | Refills: 0 | Status: SHIPPED | OUTPATIENT
Start: 2017-10-23 | End: 2018-05-16

## 2017-10-26 ENCOUNTER — TELEPHONE (OUTPATIENT)
Dept: FAMILY MEDICINE CLINIC | Facility: CLINIC | Age: 52
End: 2017-10-26

## 2017-10-26 NOTE — TELEPHONE ENCOUNTER
Spoke with [facility], they have the necessary information and the prescription is ready to be p/u.

## 2017-11-01 ENCOUNTER — CHARTING TRANS (OUTPATIENT)
Dept: OTHER | Age: 52
End: 2017-11-01

## 2017-11-02 ENCOUNTER — TELEPHONE (OUTPATIENT)
Dept: FAMILY MEDICINE CLINIC | Facility: CLINIC | Age: 52
End: 2017-11-02

## 2017-11-06 ENCOUNTER — TELEPHONE (OUTPATIENT)
Dept: FAMILY MEDICINE CLINIC | Facility: CLINIC | Age: 52
End: 2017-11-06

## 2017-11-11 ENCOUNTER — CHARTING TRANS (OUTPATIENT)
Dept: OTHER | Age: 52
End: 2017-11-11

## 2017-11-12 ENCOUNTER — CHARTING TRANS (OUTPATIENT)
Dept: OTHER | Age: 52
End: 2017-11-12

## 2017-11-13 ENCOUNTER — CHARTING TRANS (OUTPATIENT)
Dept: OTHER | Age: 52
End: 2017-11-13

## 2017-11-17 ENCOUNTER — PRIOR ORIGINAL RECORDS (OUTPATIENT)
Dept: OTHER | Age: 52
End: 2017-11-17

## 2017-11-17 ENCOUNTER — LAB ENCOUNTER (OUTPATIENT)
Dept: LAB | Age: 52
End: 2017-11-17
Attending: FAMILY MEDICINE
Payer: MEDICARE

## 2017-11-17 ENCOUNTER — OFFICE VISIT (OUTPATIENT)
Dept: FAMILY MEDICINE CLINIC | Facility: CLINIC | Age: 52
End: 2017-11-17

## 2017-11-17 VITALS
SYSTOLIC BLOOD PRESSURE: 120 MMHG | HEART RATE: 69 BPM | DIASTOLIC BLOOD PRESSURE: 60 MMHG | TEMPERATURE: 96 F | OXYGEN SATURATION: 94 %

## 2017-11-17 DIAGNOSIS — E11.65 UNCONTROLLED TYPE 2 DIABETES MELLITUS WITH MICROALBUMINURIA, WITH LONG-TERM CURRENT USE OF INSULIN (HCC): ICD-10-CM

## 2017-11-17 DIAGNOSIS — E11.21 UNCONTROLLED TYPE II DIABETES MELLITUS WITH NEPHROPATHY (HCC): Primary | ICD-10-CM

## 2017-11-17 DIAGNOSIS — D68.9 CLOTTING DISORDER (HCC): ICD-10-CM

## 2017-11-17 DIAGNOSIS — E11.65 UNCONTROLLED TYPE II DIABETES MELLITUS WITH NEPHROPATHY (HCC): Primary | ICD-10-CM

## 2017-11-17 DIAGNOSIS — Z79.4 UNCONTROLLED TYPE 2 DIABETES MELLITUS WITH MICROALBUMINURIA, WITH LONG-TERM CURRENT USE OF INSULIN (HCC): ICD-10-CM

## 2017-11-17 DIAGNOSIS — E11.65 UNCONTROLLED TYPE II DIABETES MELLITUS WITH NEPHROPATHY (HCC): ICD-10-CM

## 2017-11-17 DIAGNOSIS — R80.9 UNCONTROLLED TYPE 2 DIABETES MELLITUS WITH MICROALBUMINURIA, WITH LONG-TERM CURRENT USE OF INSULIN (HCC): ICD-10-CM

## 2017-11-17 DIAGNOSIS — I25.10 CORONARY ARTERY DISEASE INVOLVING NATIVE CORONARY ARTERY OF NATIVE HEART WITHOUT ANGINA PECTORIS: ICD-10-CM

## 2017-11-17 DIAGNOSIS — E11.21 UNCONTROLLED TYPE II DIABETES MELLITUS WITH NEPHROPATHY (HCC): ICD-10-CM

## 2017-11-17 DIAGNOSIS — E11.29 UNCONTROLLED TYPE 2 DIABETES MELLITUS WITH MICROALBUMINURIA, WITH LONG-TERM CURRENT USE OF INSULIN (HCC): ICD-10-CM

## 2017-11-17 DIAGNOSIS — N28.9 RENAL INSUFFICIENCY: ICD-10-CM

## 2017-11-17 PROCEDURE — 85305 CLOT INHIBIT PROT S TOTAL: CPT

## 2017-11-17 PROCEDURE — 99214 OFFICE O/P EST MOD 30 MIN: CPT | Performed by: FAMILY MEDICINE

## 2017-11-17 PROCEDURE — 80061 LIPID PANEL: CPT

## 2017-11-17 PROCEDURE — 86147 CARDIOLIPIN ANTIBODY EA IG: CPT

## 2017-11-17 PROCEDURE — 86146 BETA-2 GLYCOPROTEIN ANTIBODY: CPT

## 2017-11-17 PROCEDURE — 36415 COLL VENOUS BLD VENIPUNCTURE: CPT

## 2017-11-17 PROCEDURE — 80048 BASIC METABOLIC PNL TOTAL CA: CPT

## 2017-11-17 PROCEDURE — 85610 PROTHROMBIN TIME: CPT

## 2017-11-17 PROCEDURE — 36415 COLL VENOUS BLD VENIPUNCTURE: CPT | Performed by: FAMILY MEDICINE

## 2017-11-17 PROCEDURE — 83036 HEMOGLOBIN GLYCOSYLATED A1C: CPT

## 2017-11-17 PROCEDURE — 85302 CLOT INHIBIT PROT C ANTIGEN: CPT

## 2017-11-17 PROCEDURE — 85613 RUSSELL VIPER VENOM DILUTED: CPT

## 2017-11-17 PROCEDURE — 81241 F5 GENE: CPT

## 2017-11-17 NOTE — PROGRESS NOTES
Matt Roman is a 47year old female. Patient presents with: Other: F/U from [facility]. Admitted 11/10/17 released 11/13/17. Admitted for chest pains. [place] in left side of neck pt. wants Dr to check. Sore and achy.   Room 3      HPI:   Patient w mouth 2 (two) times daily.  Disp: 60 tablet Rfl: 1   NIFEDIPINE ER 60 MG Oral Tablet 24 Hr TAKE ONE TABLET BY MOUTH ONCE DAILY Disp: 30 tablet Rfl: 3   HYDROcodone-acetaminophen 5-325 MG Oral Tab Take 1-2 tabs orally every 4 hours as needed for pain  Disp: arm approx 2010, hosp at [place]   • Hx of necrotizing fasciitis     right groin, Jude did surgery 6/2014   • Hx of right BKA ([facility] [address]    • Hyperlipidemia    • Obesity, unspecified    • Osteomyelitis of right tibia [facility])     MRI at [place] November 2016.    she is already on Alimta. We will attempt to get the records from [facility] try to help clarify the issue. She is also due for hemoglobin A1c and lipid profile which will be drawn today.     Orders Placed This Encounter      Antiphospholipid Syndrome (A

## 2017-11-20 NOTE — PROGRESS NOTES
Notify cholesterol profile is good. Hemoglobin A1c has improved but is still above the goal of 8 or less. I would like her to see Dr. Mima Posey regarding the clotting disorder. We need to get the records from [facility] from her recent hospitalization.

## 2017-11-21 ENCOUNTER — CHARTING TRANS (OUTPATIENT)
Dept: CARDIOLOGY | Age: 52
End: 2017-11-21

## 2017-11-21 ENCOUNTER — MED REC SCAN ONLY (OUTPATIENT)
Dept: FAMILY MEDICINE CLINIC | Facility: CLINIC | Age: 52
End: 2017-11-21

## 2017-11-21 ENCOUNTER — CHARTING TRANS (OUTPATIENT)
Dept: OTHER | Age: 52
End: 2017-11-21

## 2017-11-27 ENCOUNTER — LABORATORY ENCOUNTER (OUTPATIENT)
Dept: LAB | Age: 52
End: 2017-11-27
Attending: FAMILY MEDICINE

## 2017-11-27 DIAGNOSIS — D68.9 CLOTTING DISORDER (HCC): Primary | ICD-10-CM

## 2017-12-04 RX ORDER — FUROSEMIDE 40 MG/1
TABLET ORAL
Qty: 90 TABLET | Refills: 0 | OUTPATIENT
Start: 2017-12-04

## 2017-12-04 RX ORDER — CARVEDILOL 12.5 MG/1
TABLET ORAL
Qty: 60 TABLET | Refills: 3 | Status: SHIPPED | OUTPATIENT
Start: 2017-12-04 | End: 2018-10-19

## 2017-12-04 RX ORDER — ATORVASTATIN CALCIUM 80 MG/1
TABLET, FILM COATED ORAL
Qty: 90 TABLET | Refills: 0 | Status: SHIPPED | OUTPATIENT
Start: 2017-12-04 | End: 2018-07-07

## 2017-12-04 NOTE — TELEPHONE ENCOUNTER
Last office visit 11-17-17 120/60  Last refill Carvedilol 10-9-17 #60 with 1 refill. Last refill Furosemide 10-23-17 #90   Last refill Atorvastatin 3-22-16 #90 with 1. Labs last done 11-20-17.

## 2017-12-07 ENCOUNTER — CHARTING TRANS (OUTPATIENT)
Dept: OTHER | Age: 52
End: 2017-12-07

## 2018-01-01 ENCOUNTER — EXTERNAL RECORD (OUTPATIENT)
Dept: HEALTH INFORMATION MANAGEMENT | Facility: OTHER | Age: 53
End: 2018-01-01

## 2018-02-01 ENCOUNTER — CHARTING TRANS (OUTPATIENT)
Dept: OTHER | Age: 53
End: 2018-02-01

## 2018-02-19 ENCOUNTER — LAB ENCOUNTER (OUTPATIENT)
Dept: LAB | Age: 53
End: 2018-02-19
Attending: FAMILY MEDICINE
Payer: MEDICARE

## 2018-02-19 ENCOUNTER — OFFICE VISIT (OUTPATIENT)
Dept: FAMILY MEDICINE CLINIC | Facility: CLINIC | Age: 53
End: 2018-02-19

## 2018-02-19 VITALS
BODY MASS INDEX: 43.4 KG/M2 | HEIGHT: 69 IN | WEIGHT: 293 LBS | TEMPERATURE: 98 F | SYSTOLIC BLOOD PRESSURE: 120 MMHG | DIASTOLIC BLOOD PRESSURE: 72 MMHG

## 2018-02-19 DIAGNOSIS — E11.65 UNCONTROLLED TYPE II DIABETES MELLITUS WITH NEPHROPATHY (HCC): ICD-10-CM

## 2018-02-19 DIAGNOSIS — E11.21 UNCONTROLLED TYPE II DIABETES MELLITUS WITH NEPHROPATHY (HCC): ICD-10-CM

## 2018-02-19 DIAGNOSIS — E11.65 UNCONTROLLED TYPE II DIABETES MELLITUS WITH NEPHROPATHY (HCC): Primary | ICD-10-CM

## 2018-02-19 DIAGNOSIS — J11.1 INFLUENZA: ICD-10-CM

## 2018-02-19 DIAGNOSIS — E11.21 UNCONTROLLED TYPE II DIABETES MELLITUS WITH NEPHROPATHY (HCC): Primary | ICD-10-CM

## 2018-02-19 LAB
EST. AVERAGE GLUCOSE BLD GHB EST-MCNC: 212 MG/DL (ref 68–126)
HBA1C MFR BLD HPLC: 9 % (ref ?–5.7)

## 2018-02-19 PROCEDURE — 36415 COLL VENOUS BLD VENIPUNCTURE: CPT

## 2018-02-19 PROCEDURE — 36415 COLL VENOUS BLD VENIPUNCTURE: CPT | Performed by: FAMILY MEDICINE

## 2018-02-19 PROCEDURE — 83036 HEMOGLOBIN GLYCOSYLATED A1C: CPT

## 2018-02-19 PROCEDURE — 99214 OFFICE O/P EST MOD 30 MIN: CPT | Performed by: FAMILY MEDICINE

## 2018-02-19 NOTE — PROGRESS NOTES
Ramón Estrada is a 47year old female. Patient presents with: Other[facility] 1      HPI:   Patient was seen at the ER at [facility] recently and had a nasal swab positive for influenza B. She was given Tamiflu. Insulin Syringe (RELION INSULIN SYRINGE) 29G X 1/2\" 1 ML Does not apply Misc Use up to 4 times daily Disp: 360 each Rfl: prn   Isosorbide Mononitrate ER 30 MG Oral Tablet 24 Hr Take 1 tablet (30 mg total) by mouth daily.  Disp: 30 tablet Rfl: 1   Oxybuty from infectious disease   • Pneumonia    • S/P coronary angiogram     November 2017, [place]. No significant change from November 2016. Stent in the proximal LAD which is patent stent in the mid LAD which is patent stent in the first diagonal is patent. organomegaly or CVA tenderness. EXTREMITIES: no edema          ASSESSMENT AND PLAN:     Uncontrolled type ii diabetes mellitus with nephropathy (hcc)  (primary encounter diagnosis)  Influenza    Plain that the flu has to essentially run its course.   She n

## 2018-02-20 NOTE — PROGRESS NOTES
Notify A1c is slightly worse than 3 months ago. Please confirm her current doses of insulin and notify me of those. Will need to adjust her insulin to get better control of her diabetes.

## 2018-02-26 ENCOUNTER — TELEPHONE (OUTPATIENT)
Dept: FAMILY MEDICINE CLINIC | Facility: CLINIC | Age: 53
End: 2018-02-26

## 2018-02-27 ENCOUNTER — OFFICE VISIT (OUTPATIENT)
Dept: FAMILY MEDICINE CLINIC | Facility: CLINIC | Age: 53
End: 2018-02-27

## 2018-02-27 VITALS
HEART RATE: 87 BPM | DIASTOLIC BLOOD PRESSURE: 60 MMHG | SYSTOLIC BLOOD PRESSURE: 110 MMHG | TEMPERATURE: 98 F | OXYGEN SATURATION: 90 %

## 2018-02-27 DIAGNOSIS — J11.1 INFLUENZA: Primary | ICD-10-CM

## 2018-02-27 DIAGNOSIS — J40 BRONCHITIS: ICD-10-CM

## 2018-02-27 PROCEDURE — 99213 OFFICE O/P EST LOW 20 MIN: CPT | Performed by: FAMILY MEDICINE

## 2018-02-27 NOTE — PROGRESS NOTES
Shana Nguyen is a 47year old female. Patient presents with: Other: fup on URI. .... room 1      HPI:   Patient was diagnosed with influenza over a week ago. She continues to have a severe cough. She does not feel short of breath.   No audible wheezin 300 each Rfl: 1   Blood Glucose Monitoring Suppl (ACCU-CHEK ERWIN PLUS) w/Device Does not apply Kit 1 Device by Other route 3 (three) times daily.  Dx: E11.21 Disp: 1 kit Rfl: 0   Insulin Syringe (RELION INSULIN SYRINGE) 29G X 1/2\" 1 ML Does not apply Misc November 2016. Stent in the proximal LAD which is patent stent in the mid LAD which is patent stent in the first diagonal is patent.   Right coronary proximal vessel lesion 100% stenosis collateral via bridging as well as collaterals from the left coronary (CPT=71020), 5/19/2016, 18:52.     TECHNIQUE:  PA and lateral chest radiographs were obtained.     PATIENT STATED HISTORY: (As transcribed by Technologist)  Chronic cough. History of HTN and CAD and below the knee amputation.  Patient was weak and unable to

## 2018-03-20 ENCOUNTER — LAB SERVICES (OUTPATIENT)
Dept: OTHER | Age: 53
End: 2018-03-20

## 2018-03-20 ENCOUNTER — CHARTING TRANS (OUTPATIENT)
Dept: OTHER | Age: 53
End: 2018-03-20

## 2018-03-20 LAB — MRSA SCREEN PCR: NORMAL

## 2018-03-21 ENCOUNTER — CHARTING TRANS (OUTPATIENT)
Dept: OTHER | Age: 53
End: 2018-03-21

## 2018-04-09 ENCOUNTER — TELEPHONE (OUTPATIENT)
Dept: FAMILY MEDICINE CLINIC | Facility: CLINIC | Age: 53
End: 2018-04-09

## 2018-04-09 NOTE — TELEPHONE ENCOUNTER
Calling the patient to advise that we received paperwork from [facility] regarding the new wheelchair.    Per medicare guidelines patient needs to make an appointment in order to cover the new wheelchair     Left detailed message that patient needs to ma

## 2018-04-11 ENCOUNTER — TELEPHONE (OUTPATIENT)
Dept: FAMILY MEDICINE CLINIC | Facility: CLINIC | Age: 53
End: 2018-04-11

## 2018-04-11 NOTE — TELEPHONE ENCOUNTER
Future Appointments  Date Time Provider Susana Sterling   5/5/2018 11:00 AM Maira Rivera DO EMGSW [facility] that the patient had to make an appointment for the progress notes.  She has made an appointment  I did receive the pa

## 2018-04-11 NOTE — TELEPHONE ENCOUNTER
Paper work was sent to our office last Friday regarding a manual wheelchair.   Please sign and date form and fax back with progress notes, etc.   Fax  [phone]

## 2018-04-16 ENCOUNTER — CHARTING TRANS (OUTPATIENT)
Dept: OTHER | Age: 53
End: 2018-04-16

## 2018-04-16 ENCOUNTER — LAB SERVICES (OUTPATIENT)
Dept: OTHER | Age: 53
End: 2018-04-16

## 2018-04-16 LAB — MRSA SCREEN PCR: NORMAL

## 2018-04-18 ENCOUNTER — CHARTING TRANS (OUTPATIENT)
Dept: OTHER | Age: 53
End: 2018-04-18

## 2018-04-30 ENCOUNTER — MED REC SCAN ONLY (OUTPATIENT)
Dept: FAMILY MEDICINE CLINIC | Facility: CLINIC | Age: 53
End: 2018-04-30

## 2018-04-30 RX ORDER — INSULIN ASPART 100 [IU]/ML
INJECTION, SOLUTION INTRAVENOUS; SUBCUTANEOUS
Qty: 60 ML | Refills: 0 | Status: SHIPPED | OUTPATIENT
Start: 2018-04-30 | End: 2018-05-16 | Stop reason: ALTCHOICE

## 2018-04-30 RX ORDER — INSULIN GLARGINE 100 [IU]/ML
INJECTION, SOLUTION SUBCUTANEOUS
Qty: 10 ML | Refills: 0 | Status: SHIPPED | OUTPATIENT
Start: 2018-04-30 | End: 2018-05-16

## 2018-05-03 ENCOUNTER — CHARTING TRANS (OUTPATIENT)
Dept: OTHER | Age: 53
End: 2018-05-03

## 2018-05-05 ENCOUNTER — PRIOR ORIGINAL RECORDS (OUTPATIENT)
Dept: OTHER | Age: 53
End: 2018-05-05

## 2018-05-05 ENCOUNTER — OFFICE VISIT (OUTPATIENT)
Dept: FAMILY MEDICINE CLINIC | Facility: CLINIC | Age: 53
End: 2018-05-05

## 2018-05-05 DIAGNOSIS — Z12.11 COLON CANCER SCREENING: ICD-10-CM

## 2018-05-05 DIAGNOSIS — E11.21 UNCONTROLLED TYPE II DIABETES MELLITUS WITH NEPHROPATHY (HCC): ICD-10-CM

## 2018-05-05 DIAGNOSIS — E11.22 CHRONIC KIDNEY DISEASE WITH END STAGE RENAL DISEASE ON DIALYSIS DUE TO TYPE 2 DIABETES MELLITUS (HCC): ICD-10-CM

## 2018-05-05 DIAGNOSIS — Z99.2 CHRONIC KIDNEY DISEASE WITH END STAGE RENAL DISEASE ON DIALYSIS DUE TO TYPE 2 DIABETES MELLITUS (HCC): ICD-10-CM

## 2018-05-05 DIAGNOSIS — Z12.39 BREAST CANCER SCREENING: ICD-10-CM

## 2018-05-05 DIAGNOSIS — N18.6 CHRONIC KIDNEY DISEASE WITH END STAGE RENAL DISEASE ON DIALYSIS DUE TO TYPE 2 DIABETES MELLITUS (HCC): ICD-10-CM

## 2018-05-05 DIAGNOSIS — E11.65 UNCONTROLLED TYPE II DIABETES MELLITUS WITH NEPHROPATHY (HCC): ICD-10-CM

## 2018-05-05 DIAGNOSIS — Z89.511 HX OF RIGHT BKA (HCC): Primary | ICD-10-CM

## 2018-05-05 DIAGNOSIS — Z12.4 CERVICAL CANCER SCREENING: ICD-10-CM

## 2018-05-05 PROCEDURE — 99214 OFFICE O/P EST MOD 30 MIN: CPT | Performed by: FAMILY MEDICINE

## 2018-05-05 PROCEDURE — 83036 HEMOGLOBIN GLYCOSYLATED A1C: CPT | Performed by: FAMILY MEDICINE

## 2018-05-05 PROCEDURE — 36415 COLL VENOUS BLD VENIPUNCTURE: CPT | Performed by: FAMILY MEDICINE

## 2018-05-05 PROCEDURE — 80061 LIPID PANEL: CPT | Performed by: FAMILY MEDICINE

## 2018-05-05 NOTE — PROGRESS NOTES
Pat Susu is a 47year old female. Patient presents with: Other: Eval for wheelchair . inrm 1      HPI:   Patient has a history of poorly controlled diabetes mellitus type 2. She is on dialysis from diabetic nephropathy.   She has had recurrent os mouth 2 (two) times daily. Disp:  Rfl:    Probiotic Product (HEALTHY COLON OR) Take 1 capsule by mouth Q12H. Disp:  Rfl:    Blood Glucose Monitoring Suppl (ACCU-CHEK ERWIN PLUS) w/Device Does not apply Kit 1 Device by Other route 3 (three) times daily.  D right upper arm approx 2010, hosp at [place]   • Hx of necrotizing fasciitis     right groin, Jude did surgery 6/2014   • Hx of right BKA ([facility] [address]    • Hyperlipidemia    • Ischemic dilated cardiomyopathy (HCC)     Ejection fraction of 45%   • Obesity, unspec apparent distress  SKIN: no rashes,no suspicious lesions  NECK: supple, no cervical adenopathy  LUNGS: clear to auscultation  CARDIO: RRR without murmur  ABD soft, nontender, normal BS, no masses, rebound or guarding. No organomegaly or CVA tenderness.   EX

## 2018-05-06 ENCOUNTER — CHARTING TRANS (OUTPATIENT)
Dept: OTHER | Age: 53
End: 2018-05-06

## 2018-05-07 ENCOUNTER — CHARTING TRANS (OUTPATIENT)
Dept: OTHER | Age: 53
End: 2018-05-07

## 2018-05-07 ENCOUNTER — TELEPHONE (OUTPATIENT)
Dept: FAMILY MEDICINE CLINIC | Facility: CLINIC | Age: 53
End: 2018-05-07

## 2018-05-07 VITALS
BODY MASS INDEX: 44 KG/M2 | HEART RATE: 73 BPM | TEMPERATURE: 98 F | DIASTOLIC BLOOD PRESSURE: 60 MMHG | WEIGHT: 293 LBS | SYSTOLIC BLOOD PRESSURE: 134 MMHG

## 2018-05-07 NOTE — TELEPHONE ENCOUNTER
CALLING ABOUT WHEELCHAIR ORDER, NEED PT'S WEIGHT DOCUMENTED WITHIN RECENT OFFICE NOTES, FAX # [phone]

## 2018-05-07 NOTE — PROGRESS NOTES
Notify A1c is worse than had been 2 months ago. As we discussed, need to see fasting blood sugars and blood sugars 2 hours after every meal on a weekly basis so I can help adjust the insulin.

## 2018-05-07 NOTE — TELEPHONE ENCOUNTER
The last weight that we have on file 2/19/18  300lb  Can you addend this into the office note from 5/5/18??

## 2018-05-08 ENCOUNTER — CHARTING TRANS (OUTPATIENT)
Dept: OTHER | Age: 53
End: 2018-05-08

## 2018-05-09 ENCOUNTER — CHARTING TRANS (OUTPATIENT)
Dept: OTHER | Age: 53
End: 2018-05-09

## 2018-05-09 ENCOUNTER — TELEPHONE (OUTPATIENT)
Dept: FAMILY MEDICINE CLINIC | Facility: CLINIC | Age: 53
End: 2018-05-09

## 2018-05-09 NOTE — TELEPHONE ENCOUNTER
Calling Boyd Aguilar to advise that he will follow her   Also to let her know that he is out of the office until next Monday     Advised

## 2018-05-09 NOTE — TELEPHONE ENCOUNTER
Stephanie has been a pt of [facility] before and fired almost every nurse they have sent her in the past. Pt moved to [place] and was assigned to them again. They made arrangements yesterday to have a nurse go out to her house today.  They agreed on a time and when th

## 2018-05-09 NOTE — TELEPHONE ENCOUNTER
[facility] called again to advise that the patient contacted them and begged for someone to come out. Florencia Villafana has agreed to send another nurse out to the house.    She did clarify that if there is ONE more issue with the patient they will discharge her and the

## 2018-05-16 ENCOUNTER — OFFICE VISIT (OUTPATIENT)
Dept: FAMILY MEDICINE CLINIC | Facility: CLINIC | Age: 53
End: 2018-05-16

## 2018-05-16 ENCOUNTER — TELEPHONE (OUTPATIENT)
Dept: FAMILY MEDICINE CLINIC | Facility: CLINIC | Age: 53
End: 2018-05-16

## 2018-05-16 VITALS
OXYGEN SATURATION: 92 % | HEART RATE: 73 BPM | TEMPERATURE: 98 F | SYSTOLIC BLOOD PRESSURE: 120 MMHG | DIASTOLIC BLOOD PRESSURE: 60 MMHG

## 2018-05-16 DIAGNOSIS — E11.22 CHRONIC KIDNEY DISEASE WITH END STAGE RENAL DISEASE ON DIALYSIS DUE TO TYPE 2 DIABETES MELLITUS (HCC): ICD-10-CM

## 2018-05-16 DIAGNOSIS — I50.31 ACUTE DIASTOLIC CONGESTIVE HEART FAILURE (HCC): ICD-10-CM

## 2018-05-16 DIAGNOSIS — E11.21 UNCONTROLLED TYPE II DIABETES MELLITUS WITH NEPHROPATHY (HCC): Primary | ICD-10-CM

## 2018-05-16 DIAGNOSIS — N18.6 CHRONIC KIDNEY DISEASE WITH END STAGE RENAL DISEASE ON DIALYSIS DUE TO TYPE 2 DIABETES MELLITUS (HCC): ICD-10-CM

## 2018-05-16 DIAGNOSIS — E11.65 UNCONTROLLED TYPE II DIABETES MELLITUS WITH NEPHROPATHY (HCC): Primary | ICD-10-CM

## 2018-05-16 DIAGNOSIS — I50.22 CHRONIC SYSTOLIC CONGESTIVE HEART FAILURE (HCC): Primary | ICD-10-CM

## 2018-05-16 DIAGNOSIS — Z99.2 CHRONIC KIDNEY DISEASE WITH END STAGE RENAL DISEASE ON DIALYSIS DUE TO TYPE 2 DIABETES MELLITUS (HCC): ICD-10-CM

## 2018-05-16 DIAGNOSIS — L03.116 CELLULITIS OF LEFT LOWER EXTREMITY: ICD-10-CM

## 2018-05-16 PROCEDURE — 99214 OFFICE O/P EST MOD 30 MIN: CPT | Performed by: FAMILY MEDICINE

## 2018-05-16 RX ORDER — SEVELAMER CARBONATE 800 MG/1
1600 TABLET, FILM COATED ORAL 3 TIMES DAILY
COMMUNITY
Start: 2018-05-09

## 2018-05-16 RX ORDER — PANTOPRAZOLE SODIUM 40 MG/1
40 TABLET, DELAYED RELEASE ORAL
Qty: 60 TABLET | Refills: 1 | COMMUNITY
Start: 2018-05-16 | End: 2018-08-01

## 2018-05-16 RX ORDER — ISOSORBIDE MONONITRATE 30 MG/1
30 TABLET, EXTENDED RELEASE ORAL DAILY
Qty: 90 TABLET | Refills: 1 | Status: SHIPPED | OUTPATIENT
Start: 2018-05-16 | End: 2018-08-01 | Stop reason: ALTCHOICE

## 2018-05-16 RX ORDER — CLINDAMYCIN HYDROCHLORIDE 300 MG/1
300 CAPSULE ORAL 4 TIMES DAILY
COMMUNITY
Start: 2018-05-08 | End: 2018-08-01 | Stop reason: ALTCHOICE

## 2018-05-16 RX ORDER — FUROSEMIDE 40 MG/1
TABLET ORAL
Qty: 90 TABLET | Refills: 1 | Status: SHIPPED | OUTPATIENT
Start: 2018-05-16 | End: 2018-10-04 | Stop reason: ALTCHOICE

## 2018-05-16 NOTE — TELEPHONE ENCOUNTER
Future Appointments  Date Time Provider Susana Sterling   5/16/2018 11:30 AM Danielito Davis, DO EMGS[facility] [place]       They are refaxing this morning- there are a lot of medication discrepancies and they need clarification    She now is home alone

## 2018-05-16 NOTE — PROGRESS NOTES
Austin Hodges is a 47year old female. Patient presents with: Other: fup from [facility] from 5/5/18--started at  and was send to [facility]. ...room 1      HPI:   Patient fell May 5. She went to the emergency room.   X-ray of the left femur Rfl: 1   [DISCONTINUED] Pantoprazole Sodium 40 MG Oral Tab EC Take 1 tablet (40 mg total) by mouth 2 (two) times daily.  Disp: 60 tablet Rfl: 1   Blood Glucose Monitoring Suppl (ACCU-CHEK ERWIN PLUS) w/Device Does not apply Kit 1 Device by Other route 3 (th right tibia [facility])     MRI at [place] November 2016. Dr. Garcia Son following from infectious disease   • S/P coronary angiogram     November 2017, [place]. No significant change from November 2016.   Stent in the proximal LAD which is patent stent in the mid LAD diabetes mellitus with nephropathy (hcc)  (primary encounter diagnosis)  Chronic kidney disease with end stage renal disease on dialysis due to type 2 diabetes mellitus (hcc)  Acute diastolic congestive heart failure (hcc)  Cellulitis of left lower extremi

## 2018-05-16 NOTE — TELEPHONE ENCOUNTER
I also need to call [facility] medical to see about them taking over the oxygen. When she was discharged from [facility] they referred her to [facility] and the patient is having problems with them.      She is on 2L O2- continuous as well as portable oxygen

## 2018-05-16 NOTE — TELEPHONE ENCOUNTER
ADMITTED HER TO HOME CARE LAST WEDS, FAXED OVER MESSAGE ABOUT MED QUESTIONS, ETC... THIS MORNING, DID WE RECIEVE?  IT IS TO GO ALONG WITH HER UPCOMING APPT, WANTS TO ADD SOME THINGS TO THE FAX SHE SENT, CALL ALVA

## 2018-05-18 ENCOUNTER — TELEPHONE (OUTPATIENT)
Dept: FAMILY MEDICINE CLINIC | Facility: CLINIC | Age: 53
End: 2018-05-18

## 2018-05-18 NOTE — TELEPHONE ENCOUNTER
Calling the patient- she has not heard from [facility] medical regarding her oxygen?      I called [facility] medical- spoke to [facility] and she advised that a different rep is working on that order so she will have them call me directly       Her wheelchair wi

## 2018-05-18 NOTE — TELEPHONE ENCOUNTER
Pt said she was here the other day and was supposed to hear from the nurse on something and she has not yet. Checking in. Would not give more information.

## 2018-05-21 NOTE — TELEPHONE ENCOUNTER
The patient called back and she has not heard anything   Calling [facility] medical [phone] with Alessio Brown- it is put on hold for Oxygen testing.  They did receive the order it was put on hold because they need her saturation levels   I am going throug

## 2018-05-22 ENCOUNTER — TELEPHONE (OUTPATIENT)
Dept: FAMILY MEDICINE CLINIC | Facility: CLINIC | Age: 53
End: 2018-05-22

## 2018-05-22 NOTE — TELEPHONE ENCOUNTER
PLEASE SEND LAST OFFICE VISIT WITHIN 30 DAYS, ALSO OXYGEN TESTING.     [address] [facility] -[phone]

## 2018-05-22 NOTE — TELEPHONE ENCOUNTER
OV note faxed. Re: oxygen testing advised that may have to come from [facility].

## 2018-05-23 ENCOUNTER — TELEPHONE (OUTPATIENT)
Dept: FAMILY MEDICINE CLINIC | Facility: CLINIC | Age: 53
End: 2018-05-23

## 2018-05-23 NOTE — TELEPHONE ENCOUNTER
Calling the patient- she wants to make an appointment with Dr Vanice Gaucher to see about seeing a pulmonologist?   Future Appointments  Date Time Provider Susana Sterling   5/29/2018 10:30 AM Sarah Garza,  EMGSW [facility] [place]           She fired her

## 2018-05-23 NOTE — TELEPHONE ENCOUNTER
Leslie Haynes FIRED [address] [place] [address] AND STATED SHE WILL FIND ANOTHER AGENCY. SHE WAS NOT HAPPY WITH NURSE THAT CAME YESTERDAY. [facility] AND SHE WILL BE DISCHARGED FROM THEIR CARE.

## 2018-05-23 NOTE — TELEPHONE ENCOUNTER
[facility] called and advised that they need the oxygen sheet that was filled out. I explained that I sent them the notes from [place] that we had.    If they need something specific they may need to contact [facility]- they are the ones that had origi

## 2018-05-23 NOTE — TELEPHONE ENCOUNTER
[facility] called and advised that they need the oxygen sheet that was filled out. I explained that I sent them the notes from [place] that we had.    If they need something specific [facility] are the ones that had origi

## 2018-05-23 NOTE — TELEPHONE ENCOUNTER
----- Message from Freedom Orourke sent at 5/23/2018 11:01 AM CDT -----  Contact: [phone]  SUHA SAID SHE WAS RETURNING A CALL TO [facility], SHE STATED IT WAS FROM THE OTHER DAY.

## 2018-05-29 ENCOUNTER — CHARTING TRANS (OUTPATIENT)
Dept: OTHER | Age: 53
End: 2018-05-29

## 2018-05-31 ENCOUNTER — TELEPHONE (OUTPATIENT)
Dept: FAMILY MEDICINE CLINIC | Facility: CLINIC | Age: 53
End: 2018-05-31

## 2018-05-31 DIAGNOSIS — I50.31 ACUTE DIASTOLIC CONGESTIVE HEART FAILURE (HCC): Primary | ICD-10-CM

## 2018-05-31 NOTE — TELEPHONE ENCOUNTER
Pt said she is using a different company for oxygen and having issues with them. Wants to speak to nurse.

## 2018-05-31 NOTE — TELEPHONE ENCOUNTER
Calling the patient-   She has not heard anything from [facility] so she scheduled an appointment with a pulmonologist to see if she even really needs the oxygen     She needs all her tanks returned to [facility] but they need an order from Dr Carey Avalos to d/c se

## 2018-06-04 RX ORDER — PANTOPRAZOLE SODIUM 40 MG/1
TABLET, DELAYED RELEASE ORAL
Qty: 60 TABLET | Refills: 1 | OUTPATIENT
Start: 2018-06-04

## 2018-06-06 ENCOUNTER — CHARTING TRANS (OUTPATIENT)
Dept: OTHER | Age: 53
End: 2018-06-06

## 2018-06-09 ENCOUNTER — LAB SERVICES (OUTPATIENT)
Dept: OTHER | Age: 53
End: 2018-06-09

## 2018-06-09 ENCOUNTER — CHARTING TRANS (OUTPATIENT)
Dept: OTHER | Age: 53
End: 2018-06-09

## 2018-06-09 ENCOUNTER — IMAGING SERVICES (OUTPATIENT)
Dept: OTHER | Age: 53
End: 2018-06-09

## 2018-06-09 LAB
BUN SERPL-MCNC: 46 MG/DL (ref 7–20)
CALCIUM SERPL-MCNC: 9 MG/DL (ref 8.6–10.6)
CHLORIDE SERPL-SCNC: 96 MMOL/L (ref 96–107)
CO2 SERPL-SCNC: 29 MMOL/L (ref 22–32)
CREAT SERPL-MCNC: 5 MG/DL (ref 0.5–1.4)
DIFFERENTIAL TYPE: ABNORMAL
GFR SERPL CREATININE-BSD FRML MDRD: 11 ML/MIN/{1.73M2}
GFR SERPL CREATININE-BSD FRML MDRD: 9 ML/MIN/{1.73M2}
GLUCOSE SERPL-MCNC: 240 MG/DL (ref 70–200)
HEMATOCRIT: 35.3 % (ref 34–45)
HEMOGLOBIN: 12 G/DL (ref 11.2–15.7)
INTERNATIONAL NORMALIZED RATIO: 1
LYMPH PERCENT: 15.8 % (ref 20.5–51.1)
LYMPHOCYTE ABSOLUTE #: 1.1 10*3/UL (ref 1.2–3.4)
MAGNESIUM SERPL-MCNC: 2.3 MG/DL (ref 1.6–2.6)
MEAN CORPUSCULAR HGB CONCENTRATION: 34 % (ref 32–36)
MEAN CORPUSCULAR HGB: 33 PG (ref 27–34)
MEAN CORPUSCULAR VOLUME: 97 FL (ref 79–95)
MEAN PLATELET VOLUME: 8.8 FL (ref 8.6–12.4)
MIXED %: 8.3 % (ref 4.3–12.9)
MIXED ABSOLUTE #: 0.6 10*3/UL (ref 0.2–0.9)
NEUTROPHIL ABSOLUTE #: 5 10*3/UL (ref 1.4–6.5)
NEUTROPHIL PERCENT: 75.9 % (ref 34–73.5)
PLATELET COUNT: 242 10*3/UL (ref 150–400)
POTASSIUM SERPL-SCNC: 4 MMOL/L (ref 3.5–5.3)
PROTHROMBIN TIME: 9.9 S (ref 9.5–11.5)
RED BLOOD CELL COUNT: 3.64 10*6/UL (ref 3.7–5.2)
RED CELL DISTRIBUTION WIDTH: 13.9 % (ref 11.3–14.8)
SODIUM SERPL-SCNC: 139 MMOL/L (ref 136–146)
WHITE BLOOD CELL COUNT: 6.7 10*3/UL (ref 4–10)

## 2018-06-11 ENCOUNTER — CHARTING TRANS (OUTPATIENT)
Dept: OTHER | Age: 53
End: 2018-06-11

## 2018-06-13 ENCOUNTER — CHARTING TRANS (OUTPATIENT)
Dept: OTHER | Age: 53
End: 2018-06-13

## 2018-06-13 ENCOUNTER — LAB SERVICES (OUTPATIENT)
Dept: OTHER | Age: 53
End: 2018-06-13

## 2018-06-13 ENCOUNTER — TELEPHONE (OUTPATIENT)
Dept: FAMILY MEDICINE CLINIC | Facility: CLINIC | Age: 53
End: 2018-06-13

## 2018-06-13 LAB — BEDSIDE GLUCOSE: 256 MG/DL (ref 70–110)

## 2018-06-13 NOTE — TELEPHONE ENCOUNTER
SUHA JUST GOT OUT OF [facility] AND NEEDS A HOME HEALTH SERVICE SET UP AND THEY ARE ASKING THAT IT NOT BE [facility]. ...   CALL RAJENDRA TO LET HER KNOW

## 2018-06-13 NOTE — TELEPHONE ENCOUNTER
Calling Kevin Uribe because the hospital/surgeons office should     The cardiologist says that the PCP needs to order the [place]. I explained that we would not have any orders. Her mom is at [facility] and should be d/c tomorrow.      I explained that  w

## 2018-06-14 ENCOUNTER — CHARTING TRANS (OUTPATIENT)
Dept: OTHER | Age: 53
End: 2018-06-14

## 2018-06-18 ENCOUNTER — MED REC SCAN ONLY (OUTPATIENT)
Dept: FAMILY MEDICINE CLINIC | Facility: CLINIC | Age: 53
End: 2018-06-18

## 2018-06-27 ENCOUNTER — CHARTING TRANS (OUTPATIENT)
Dept: OTHER | Age: 53
End: 2018-06-27

## 2018-06-29 ENCOUNTER — CHARTING TRANS (OUTPATIENT)
Dept: OTHER | Age: 53
End: 2018-06-29

## 2018-07-07 DIAGNOSIS — R10.9 ABDOMINAL PAIN, UNSPECIFIED ABDOMINAL LOCATION: ICD-10-CM

## 2018-07-07 DIAGNOSIS — Z87.39 HX OF NECROTIZING FASCIITIS: ICD-10-CM

## 2018-07-07 RX ORDER — PANTOPRAZOLE SODIUM 40 MG/1
TABLET, DELAYED RELEASE ORAL
Qty: 60 TABLET | Refills: 1 | Status: SHIPPED | OUTPATIENT
Start: 2018-07-07 | End: 2018-10-19

## 2018-07-07 RX ORDER — NEEDLES, SAFETY 22GX1 1/2"
NEEDLE, DISPOSABLE MISCELLANEOUS
Qty: 300 EACH | Refills: 1 | Status: SHIPPED | OUTPATIENT
Start: 2018-07-07 | End: 2018-07-07 | Stop reason: CLARIF

## 2018-07-07 RX ORDER — ATORVASTATIN CALCIUM 80 MG/1
TABLET, FILM COATED ORAL
Qty: 90 TABLET | Refills: 0 | Status: SHIPPED | OUTPATIENT
Start: 2018-07-07 | End: 2018-10-19

## 2018-07-07 RX ORDER — NITROGLYCERIN 0.6 MG/1
0.6 TABLET SUBLINGUAL EVERY 5 MIN PRN
Qty: 30 TABLET | Status: SHIPPED | OUTPATIENT
Start: 2018-07-07 | End: 2019-01-01

## 2018-07-07 NOTE — TELEPHONE ENCOUNTER
Pantoprazole 5/16/18 #60x1  Atorvastatin 12/4/17 #90x0  BD insulin syringe 10/23/17 #300x1  Novolog-Therapy completed on 5/16/18    I called the pt to review her meds with her.  Tonny Sharma

## 2018-07-07 NOTE — TELEPHONE ENCOUNTER
[facility] that they only carry 31G insulin syringe nIeedles 1/2\" 1mL    Can not find this as an option  Faxing the \"ok\" [facility] to [facility]

## 2018-07-07 NOTE — TELEPHONE ENCOUNTER
Pt called back and is unsure which Insulins she is on.  She states she will call back Monday to verify which med she is on. Yemi Hernandez

## 2018-07-10 ENCOUNTER — TELEPHONE (OUTPATIENT)
Dept: FAMILY MEDICINE CLINIC | Facility: CLINIC | Age: 53
End: 2018-07-10

## 2018-07-10 RX ORDER — INSULIN ASPART 100 [IU]/ML
INJECTION, SOLUTION INTRAVENOUS; SUBCUTANEOUS
Qty: 3 VIAL | Refills: 3 | Status: SHIPPED | OUTPATIENT
Start: 2018-07-10 | End: 2018-07-11

## 2018-07-10 NOTE — TELEPHONE ENCOUNTER
Calling the patient back-     Her insurance covers the Novolog now  I will switch the Humalog.  I did confirm that she is still on the same dose     She also needs a refill on the Lantus       She said that there was an issue with the syringes - I didn't se

## 2018-07-10 NOTE — TELEPHONE ENCOUNTER
----- Message from Gilda Yeager sent at 7/10/2018  1:58 PM CDT -----  Contact: Sage Brown called back from Saturday [phone]

## 2018-07-11 RX ORDER — INSULIN ASPART 100 [IU]/ML
INJECTION, SOLUTION INTRAVENOUS; SUBCUTANEOUS
Refills: 0 | OUTPATIENT
Start: 2018-07-11

## 2018-07-11 RX ORDER — INSULIN ASPART 100 [IU]/ML
INJECTION, SOLUTION INTRAVENOUS; SUBCUTANEOUS
Qty: 3 VIAL | Refills: 3 | Status: SHIPPED | OUTPATIENT
Start: 2018-07-11 | End: 2018-10-04

## 2018-07-11 NOTE — TELEPHONE ENCOUNTER
Received a fax from [address] [place] [address] we need to indicate how many units/day she takes?      She says minimum is 60 units  Max is 70-75 units/day     Sending new script

## 2018-07-22 ENCOUNTER — CHARTING TRANS (OUTPATIENT)
Dept: OTHER | Age: 53
End: 2018-07-22

## 2018-07-23 ENCOUNTER — CHARTING TRANS (OUTPATIENT)
Dept: OTHER | Age: 53
End: 2018-07-23

## 2018-07-24 ENCOUNTER — CHARTING TRANS (OUTPATIENT)
Dept: OTHER | Age: 53
End: 2018-07-24

## 2018-07-24 ENCOUNTER — TELEPHONE (OUTPATIENT)
Dept: FAMILY MEDICINE CLINIC | Facility: CLINIC | Age: 53
End: 2018-07-24

## 2018-07-26 ENCOUNTER — TELEPHONE (OUTPATIENT)
Dept: FAMILY MEDICINE CLINIC | Facility: CLINIC | Age: 53
End: 2018-07-26

## 2018-07-26 NOTE — TELEPHONE ENCOUNTER
MECHE- D/C FROM [facility] LAST Tuesday AND STARTING HOME WOUND CARE  1XTHIS WEEK, 2X NEXT WEEK, 2XWEEK AFTER

## 2018-07-27 ENCOUNTER — MED REC SCAN ONLY (OUTPATIENT)
Dept: FAMILY MEDICINE CLINIC | Facility: CLINIC | Age: 53
End: 2018-07-27

## 2018-08-01 ENCOUNTER — OFFICE VISIT (OUTPATIENT)
Dept: FAMILY MEDICINE CLINIC | Facility: CLINIC | Age: 53
End: 2018-08-01
Payer: MEDICARE

## 2018-08-01 ENCOUNTER — PRIOR ORIGINAL RECORDS (OUTPATIENT)
Dept: OTHER | Age: 53
End: 2018-08-01

## 2018-08-01 ENCOUNTER — APPOINTMENT (OUTPATIENT)
Dept: LAB | Age: 53
End: 2018-08-01
Attending: FAMILY MEDICINE
Payer: MEDICARE

## 2018-08-01 VITALS
TEMPERATURE: 98 F | HEART RATE: 84 BPM | OXYGEN SATURATION: 97 % | DIASTOLIC BLOOD PRESSURE: 80 MMHG | SYSTOLIC BLOOD PRESSURE: 110 MMHG

## 2018-08-01 DIAGNOSIS — E11.65 UNCONTROLLED TYPE 2 DIABETES MELLITUS WITH MICROALBUMINURIA, WITH LONG-TERM CURRENT USE OF INSULIN (HCC): Primary | ICD-10-CM

## 2018-08-01 DIAGNOSIS — E11.29 UNCONTROLLED TYPE 2 DIABETES MELLITUS WITH MICROALBUMINURIA, WITH LONG-TERM CURRENT USE OF INSULIN (HCC): ICD-10-CM

## 2018-08-01 DIAGNOSIS — R80.9 UNCONTROLLED TYPE 2 DIABETES MELLITUS WITH MICROALBUMINURIA, WITH LONG-TERM CURRENT USE OF INSULIN (HCC): ICD-10-CM

## 2018-08-01 DIAGNOSIS — Z12.39 BREAST CANCER SCREENING: ICD-10-CM

## 2018-08-01 DIAGNOSIS — Z79.4 UNCONTROLLED TYPE 2 DIABETES MELLITUS WITH MICROALBUMINURIA, WITH LONG-TERM CURRENT USE OF INSULIN (HCC): Primary | ICD-10-CM

## 2018-08-01 DIAGNOSIS — E11.65 UNCONTROLLED TYPE 2 DIABETES MELLITUS WITH MICROALBUMINURIA, WITH LONG-TERM CURRENT USE OF INSULIN (HCC): ICD-10-CM

## 2018-08-01 DIAGNOSIS — Z12.11 COLON CANCER SCREENING: ICD-10-CM

## 2018-08-01 DIAGNOSIS — R80.9 UNCONTROLLED TYPE 2 DIABETES MELLITUS WITH MICROALBUMINURIA, WITH LONG-TERM CURRENT USE OF INSULIN (HCC): Primary | ICD-10-CM

## 2018-08-01 DIAGNOSIS — Z79.4 UNCONTROLLED TYPE 2 DIABETES MELLITUS WITH MICROALBUMINURIA, WITH LONG-TERM CURRENT USE OF INSULIN (HCC): ICD-10-CM

## 2018-08-01 DIAGNOSIS — E11.29 UNCONTROLLED TYPE 2 DIABETES MELLITUS WITH MICROALBUMINURIA, WITH LONG-TERM CURRENT USE OF INSULIN (HCC): Primary | ICD-10-CM

## 2018-08-01 LAB
EST. AVERAGE GLUCOSE BLD GHB EST-MCNC: 235 MG/DL (ref 68–126)
HBA1C MFR BLD HPLC: 9.8 % (ref ?–5.7)

## 2018-08-01 PROCEDURE — 36415 COLL VENOUS BLD VENIPUNCTURE: CPT | Performed by: FAMILY MEDICINE

## 2018-08-01 PROCEDURE — 83036 HEMOGLOBIN GLYCOSYLATED A1C: CPT

## 2018-08-01 PROCEDURE — 36415 COLL VENOUS BLD VENIPUNCTURE: CPT

## 2018-08-01 PROCEDURE — 99214 OFFICE O/P EST MOD 30 MIN: CPT | Performed by: FAMILY MEDICINE

## 2018-08-01 RX ORDER — ISOSORBIDE MONONITRATE 60 MG/1
60 TABLET, EXTENDED RELEASE ORAL DAILY
COMMUNITY
Start: 2018-07-24 | End: 2018-10-04 | Stop reason: ALTCHOICE

## 2018-08-01 RX ORDER — NIFEDIPINE 60 MG/1
60 TABLET, EXTENDED RELEASE ORAL DAILY
COMMUNITY
End: 2018-08-01

## 2018-08-01 RX ORDER — PROMETHAZINE HYDROCHLORIDE 25 MG/1
25 TABLET ORAL EVERY 8 HOURS PRN
COMMUNITY
End: 2019-03-20 | Stop reason: ALTCHOICE

## 2018-08-01 NOTE — PROGRESS NOTES
Malorie Hunter is a 47year old female. Patient presents with: Other: fup from [facility] from 7/21-7/24  . .... DUE FOR MICRO AND EYE EXAM!!      HPI:   Patient presented to [facility] with chest pain. She was transferred to [facility].   She und [DISCONTINUED] Pantoprazole Sodium 40 MG Oral Tab EC Take 1 tablet (40 mg total) by mouth every morning before breakfast. Disp: 60 tablet Rfl: 1   Glucose Blood (ONETOUCH ULTRA BLUE) In Vitro Strip USE ONE STRIP TO CHECK GLUCOSE THREE TIMES DAILY.  Dx: E1 Osteomyelitis of right tibia [facility])     MRI at [facility] November 2016.   Dr. Isamar Hernandez following from infectious disease   • PAD (peripheral artery disease) [facility])     Status post angioplasty and stent to the left superficial femoral artery, June 2018, [facility] BS, no masses, rebound or guarding. No organomegaly or CVA tenderness.   EXTREMITIES: no edema          ASSESSMENT AND PLAN:     Uncontrolled type 2 diabetes mellitus with microalbuminuria, with long-term current use of insulin (hcc)  (primary encounter zohra

## 2018-08-02 ENCOUNTER — MED REC SCAN ONLY (OUTPATIENT)
Dept: FAMILY MEDICINE CLINIC | Facility: CLINIC | Age: 53
End: 2018-08-02

## 2018-08-02 ENCOUNTER — IMAGING SERVICES (OUTPATIENT)
Dept: OTHER | Age: 53
End: 2018-08-02

## 2018-08-02 ENCOUNTER — CHARTING TRANS (OUTPATIENT)
Dept: OTHER | Age: 53
End: 2018-08-02

## 2018-08-03 ENCOUNTER — CHARTING TRANS (OUTPATIENT)
Dept: OTHER | Age: 53
End: 2018-08-03

## 2018-08-10 ENCOUNTER — CHARTING TRANS (OUTPATIENT)
Dept: OTHER | Age: 53
End: 2018-08-10

## 2018-08-11 ENCOUNTER — TELEPHONE (OUTPATIENT)
Dept: FAMILY MEDICINE CLINIC | Facility: CLINIC | Age: 53
End: 2018-08-11

## 2018-08-11 NOTE — TELEPHONE ENCOUNTER
Did her paperwork for disability get filled out and sent to [facility]?

## 2018-08-11 NOTE — TELEPHONE ENCOUNTER
Contacted patient to receive more information. Pt reports she had the paperwork faxed to the office. Spoke with Dr. Ruel Carrasquillo and he does not recall having signed the paperwork or not.  Instructed patient to have the paperwork resent while we look into this

## 2018-08-17 ENCOUNTER — IMAGING SERVICES (OUTPATIENT)
Dept: OTHER | Age: 53
End: 2018-08-17

## 2018-08-20 ENCOUNTER — CHARTING TRANS (OUTPATIENT)
Dept: OTHER | Age: 53
End: 2018-08-20

## 2018-09-01 ENCOUNTER — LAB SERVICES (OUTPATIENT)
Dept: OTHER | Age: 53
End: 2018-09-01

## 2018-09-01 LAB
BASOPHIL %: 0.1 % (ref 0–1.2)
BASOPHIL ABSOLUTE #: 0 10*3/UL (ref 0–0.1)
BUN SERPL-MCNC: 30 MG/DL (ref 7–20)
CALCIUM SERPL-MCNC: 9 MG/DL (ref 8.6–10.6)
CHLORIDE SERPL-SCNC: 98 MMOL/L (ref 96–107)
CHOLEST SERPL-MCNC: 182 MG/DL (ref 140–200)
CO2 SERPL-SCNC: 29 MMOL/L (ref 22–32)
CREAT SERPL-MCNC: 4.8 MG/DL (ref 0.5–1.4)
DIFFERENTIAL TYPE: ABNORMAL
EOSINOPHIL %: 2.1 % (ref 0–10)
EOSINOPHIL ABSOLUTE #: 0.2 10*3/UL (ref 0–0.5)
GFR SERPL CREATININE-BSD FRML MDRD: 10 ML/MIN/{1.73M2}
GFR SERPL CREATININE-BSD FRML MDRD: 12 ML/MIN/{1.73M2}
GLUCOSE SERPL-MCNC: 175 MG/DL (ref 70–200)
HDLC SERPL-MCNC: 34 MG/DL
HEMATOCRIT: 32.2 % (ref 34–45)
HEMOGLOBIN: 10.4 G/DL (ref 11.2–15.7)
INTERNATIONAL NORMALIZED RATIO: 1
LYMPH PERCENT: 16.1 % (ref 20.5–51.1)
LYMPHOCYTE ABSOLUTE #: 1.2 10*3/UL (ref 1.2–3.4)
MAGNESIUM SERPL-MCNC: 2.2 MG/DL (ref 1.6–2.6)
MEAN CORPUSCULAR HGB CONCENTRATION: 32.3 % (ref 32–36)
MEAN CORPUSCULAR HGB: 34.4 PG (ref 27–34)
MEAN CORPUSCULAR VOLUME: 106.6 FL (ref 79–95)
MEAN PLATELET VOLUME: 9.9 FL (ref 8.6–12.4)
MONOCYTE ABSOLUTE #: 0.6 10*3/UL (ref 0.2–0.9)
MONOCYTE PERCENT: 8.2 % (ref 4.3–12.9)
NEUTROPHIL ABSOLUTE #: 5.6 10*3/UL (ref 1.4–6.5)
NEUTROPHIL PERCENT: 73.5 % (ref 34–73.5)
PLATELET COUNT: 229 10*3/UL (ref 150–400)
POTASSIUM SERPL-SCNC: 4 MMOL/L (ref 3.5–5.3)
PROTHROMBIN TIME: 9.8 S (ref 9.5–11.5)
RED BLOOD CELL COUNT: 3.02 10*6/UL (ref 3.7–5.2)
RED CELL DISTRIBUTION WIDTH: 14.7 % (ref 11.3–14.8)
SODIUM SERPL-SCNC: 143 MMOL/L (ref 136–146)
TRIGL SERPL-MCNC: 357 MG/DL (ref 0–200)
WHITE BLOOD CELL COUNT: 7.6 10*3/UL (ref 4–10)

## 2018-09-03 ENCOUNTER — CHARTING TRANS (OUTPATIENT)
Dept: OTHER | Age: 53
End: 2018-09-03

## 2018-09-10 ENCOUNTER — PRIOR ORIGINAL RECORDS (OUTPATIENT)
Dept: OTHER | Age: 53
End: 2018-09-10

## 2018-09-10 ENCOUNTER — CHARTING TRANS (OUTPATIENT)
Dept: OTHER | Age: 53
End: 2018-09-10

## 2018-09-11 ENCOUNTER — CHARTING TRANS (OUTPATIENT)
Dept: OTHER | Age: 53
End: 2018-09-11

## 2018-09-13 ENCOUNTER — CHARTING TRANS (OUTPATIENT)
Dept: OTHER | Age: 53
End: 2018-09-13

## 2018-09-17 ENCOUNTER — TELEPHONE (OUTPATIENT)
Dept: FAMILY MEDICINE CLINIC | Facility: CLINIC | Age: 53
End: 2018-09-17

## 2018-09-17 NOTE — TELEPHONE ENCOUNTER
Singh Haro went to the patient's home and the patient declined care. She told Lisa Osgood that her wound is better.

## 2018-09-20 ENCOUNTER — CHARTING TRANS (OUTPATIENT)
Dept: OTHER | Age: 53
End: 2018-09-20

## 2018-09-21 ENCOUNTER — CHARTING TRANS (OUTPATIENT)
Dept: OTHER | Age: 53
End: 2018-09-21

## 2018-10-04 ENCOUNTER — OFFICE VISIT (OUTPATIENT)
Dept: FAMILY MEDICINE CLINIC | Facility: CLINIC | Age: 53
End: 2018-10-04
Payer: MEDICARE

## 2018-10-04 ENCOUNTER — TELEPHONE (OUTPATIENT)
Dept: FAMILY MEDICINE CLINIC | Facility: CLINIC | Age: 53
End: 2018-10-04

## 2018-10-04 VITALS
DIASTOLIC BLOOD PRESSURE: 70 MMHG | TEMPERATURE: 98 F | OXYGEN SATURATION: 90 % | SYSTOLIC BLOOD PRESSURE: 134 MMHG | HEART RATE: 93 BPM

## 2018-10-04 DIAGNOSIS — E11.21 UNCONTROLLED TYPE II DIABETES MELLITUS WITH NEPHROPATHY (HCC): ICD-10-CM

## 2018-10-04 DIAGNOSIS — Z89.511 HX OF RIGHT BKA (HCC): ICD-10-CM

## 2018-10-04 DIAGNOSIS — N18.6 CHRONIC KIDNEY DISEASE WITH END STAGE RENAL DISEASE ON DIALYSIS DUE TO TYPE 2 DIABETES MELLITUS (HCC): ICD-10-CM

## 2018-10-04 DIAGNOSIS — Z99.2 CHRONIC KIDNEY DISEASE WITH END STAGE RENAL DISEASE ON DIALYSIS DUE TO TYPE 2 DIABETES MELLITUS (HCC): ICD-10-CM

## 2018-10-04 DIAGNOSIS — Z87.39 HX OF NECROTIZING FASCIITIS: ICD-10-CM

## 2018-10-04 DIAGNOSIS — I25.10 CORONARY ARTERY DISEASE INVOLVING NATIVE CORONARY ARTERY OF NATIVE HEART WITHOUT ANGINA PECTORIS: Primary | ICD-10-CM

## 2018-10-04 DIAGNOSIS — I73.9 PAD (PERIPHERAL ARTERY DISEASE) (HCC): ICD-10-CM

## 2018-10-04 DIAGNOSIS — E11.22 CHRONIC KIDNEY DISEASE WITH END STAGE RENAL DISEASE ON DIALYSIS DUE TO TYPE 2 DIABETES MELLITUS (HCC): ICD-10-CM

## 2018-10-04 DIAGNOSIS — E11.65 UNCONTROLLED TYPE II DIABETES MELLITUS WITH NEPHROPATHY (HCC): ICD-10-CM

## 2018-10-04 PROCEDURE — 99214 OFFICE O/P EST MOD 30 MIN: CPT | Performed by: FAMILY MEDICINE

## 2018-10-04 RX ORDER — ISOSORBIDE MONONITRATE 30 MG/1
30 TABLET, EXTENDED RELEASE ORAL DAILY
COMMUNITY
End: 2019-01-01

## 2018-10-04 RX ORDER — AMOXICILLIN 250 MG
1 CAPSULE ORAL 2 TIMES DAILY PRN
COMMUNITY

## 2018-10-04 RX ORDER — POLYETHYLENE GLYCOL 3350 17 G/17G
17 POWDER, FOR SOLUTION ORAL DAILY PRN
COMMUNITY
End: 2019-01-01 | Stop reason: ALTCHOICE

## 2018-10-04 RX ORDER — INSULIN ASPART 100 [IU]/ML
INJECTION, SOLUTION INTRAVENOUS; SUBCUTANEOUS
Qty: 3 VIAL | Refills: 3 | Status: SHIPPED | OUTPATIENT
Start: 2018-10-04 | End: 2019-03-20 | Stop reason: ALTCHOICE

## 2018-10-04 RX ORDER — IBUPROFEN 200 MG
TABLET ORAL
Qty: 360 EACH | Refills: 0 | Status: SHIPPED | OUTPATIENT
Start: 2018-10-04 | End: 2018-10-05 | Stop reason: CLARIF

## 2018-10-04 RX ORDER — FUROSEMIDE 40 MG/1
40 TABLET ORAL 2 TIMES DAILY
Qty: 60 TABLET | Refills: 1 | Status: SHIPPED | OUTPATIENT
Start: 2018-10-04

## 2018-10-04 RX ORDER — FUROSEMIDE 40 MG/1
40 TABLET ORAL 2 TIMES DAILY
COMMUNITY
Start: 2018-10-01 | End: 2018-10-04

## 2018-10-04 NOTE — TELEPHONE ENCOUNTER
LOV- 10/4/2018  No medications were changed during office visit with Messi. Medications were refilled and sent to [facility] in [place].

## 2018-10-04 NOTE — TELEPHONE ENCOUNTER
MEDICAL RECORD RELEASE FORM FAXED TO [address] [facility] [phone] [address] [place] [address] [phone]

## 2018-10-04 NOTE — PROGRESS NOTES
Shana Nguyen is a 47year old female. Patient presents with: Other: fup from [facility] [place] for vomiting, trouble breathing--also pt needs referral to get leg checked out and also pt states there is something going on on vagina. ...room 1      HPI: Disp: 90 tablet Rfl: 0   RENVELA 800 MG Oral Tab Take 1,600 mg by mouth 3 (three) times daily.    Disp:  Rfl:    CARVEDILOL 12.5 MG Oral Tab TAKE ONE TABLET BY MOUTH TWICE DAILY Disp: 60 tablet Rfl: 3   NIFEDIPINE ER 60 MG Oral Tablet 24 Hr TAKE ONE TABLET right groin, Sorrel did surgery 6/2014   • Hx of right BKA (HCC)    • Hyperlipidemia    • Ischemic dilated cardiomyopathy ([facility] [address]     Ejection fraction of 45%   • Obesity, unspecified    • Osteomyelitis of right tibia [facility])     MRI at [facility] November 2016 cyt to left suprapubic area  NECK: supple, no cervical adenopathy  LUNGS: clear to auscultation  CARDIO: RRR without murmur  ABD soft, nontender, normal BS, no masses, rebound or guarding. No organomegaly or CVA tenderness.   EXTREMITIES: Right BKA, left lo mm and 2.75 x 29 mm Promus   Premier KAVITA, PCI of D1 with placement of 2.75 x 20 m Promus Premier KAVITA, POBA of dLAD with a 2.5 x   20mm PTCA balloon (07/27/2016), PCI of ostial to pLAD with placement of 3.5 x 16 mm Promus Premier   KAVITA and PCI of mLAD with minutes        FLUORO DOSE: 861.07 mGy (Air Kerma)        TOTAL CONTRAST USED: 87 mls of Isovue 300            FINDINGS:        HEMODYNAMICS    1.  Heart rate was 76 bpm in sinus rhythm    2.  Aortic Pressure was 139/76, mean 99 mmHg    3.  Left ventricula essentially unchanged from angiogram performed  on 07/24/2018.    4.  Previously placed stents in the proximal to mid LAD, proximal D1, and mLCx remained widely   patent with mild in-stent restenosis.    5.  There was no angiographic evidence of new thromb IND:                                                                                                                                              PJWEIAWO:                                                                                                    celiac and superior mesenteric arteries are  patent with only mild atherosclerotic disease along the origin.                                                                           There are 3 right renal arteries.  These are patent but relatively  dimin mixed  calcified and noncalcified atherosclerotic disease.  There is a left  superficial femoral artery stent.  This is occluded without contrast  opacification.  The left deep femoral artery is patent.  Collateral  vessels are seen in the proximal thigh. hernia.                                 IMPRESSION:                                                                                                                                       1.  Technically difficult examination as described above.  Within this type ii diabetes mellitus with nephropathy (hcc)  Pad (peripheral artery disease) (hcc)    She has had stents placed in the superficial femoral arteries bilaterally. She does have mild to moderate disease of her iliac arteries.   I will refer her to Dr. Kurtis Holden

## 2018-10-04 NOTE — TELEPHONE ENCOUNTER
MEDICAL RECORD RELEASE FORM FAXED TO [facility] [phone] FOR DR Adriana Potter [facility] [phone]

## 2018-10-05 ENCOUNTER — TELEPHONE (OUTPATIENT)
Dept: FAMILY MEDICINE CLINIC | Facility: CLINIC | Age: 53
End: 2018-10-05

## 2018-10-05 RX ORDER — BLOOD SUGAR DIAGNOSTIC
STRIP MISCELLANEOUS
Qty: 1 BOX | Refills: 11 | Status: SHIPPED | OUTPATIENT
Start: 2018-10-05 | End: 2018-10-19

## 2018-10-05 NOTE — TELEPHONE ENCOUNTER
----- Message from Frankie Aivla DO sent at 10/4/2018 12:03 PM CDT -----  Please make an appointment for Nader Pearce to see Dr. Johanna Winters at [facility]. My notes today includes copies of the recent reports she had at [facility].   It will be helpful fo

## 2018-10-05 NOTE — TELEPHONE ENCOUNTER
Stephanie called back- she advised that Dr Junito Nino is booked into th end of January and she sees her cardiologist in December and was hoping to have the 2nd opinion prior.    They are also supposed to call her back in 24-48 hours about scheduling an appoint

## 2018-10-05 NOTE — TELEPHONE ENCOUNTER
CALLING THE PATIENT- I am not sure if she wants to make the appointment or me due to her driving situation   I will also fax the records for Dr Johanna Winters

## 2018-10-05 NOTE — TELEPHONE ENCOUNTER
FAX FAILED 4 TIMES, PHONE LINE FOR MEDICAL RECORDS BUSY.    MAILED RELEASE FORM TO [address]  [place], [facility] [address]

## 2018-10-05 NOTE — TELEPHONE ENCOUNTER
Calling [facility]- I spoke with Mandy and she advised that Dr Louise Lozoya will review the records that I faxed.    They are looking at 11/14/18 unles this needs to be expedited sooner    They will f/up with her on Monday   Calling the patient to ad

## 2018-10-05 NOTE — TELEPHONE ENCOUNTER
----- Message from Mikey Aase, DO sent at 10/4/2018 12:03 PM CDT -----  Please make an appointment for Zenia Agrawal to see Dr. Lorraine Dean at [facility]. My notes today includes copies of the recent reports she had at [facility].   It will be helpful fo

## 2018-10-17 ENCOUNTER — MED REC SCAN ONLY (OUTPATIENT)
Dept: FAMILY MEDICINE CLINIC | Facility: CLINIC | Age: 53
End: 2018-10-17

## 2018-10-19 ENCOUNTER — PRIOR ORIGINAL RECORDS (OUTPATIENT)
Dept: OTHER | Age: 53
End: 2018-10-19

## 2018-10-19 ENCOUNTER — APPOINTMENT (OUTPATIENT)
Dept: GENERAL RADIOLOGY | Facility: HOSPITAL | Age: 53
End: 2018-10-19
Payer: MEDICARE

## 2018-10-19 ENCOUNTER — MYAURORA ACCOUNT LINK (OUTPATIENT)
Dept: OTHER | Age: 53
End: 2018-10-19

## 2018-10-19 ENCOUNTER — HOSPITAL ENCOUNTER (OUTPATIENT)
Facility: HOSPITAL | Age: 53
Setting detail: OBSERVATION
Discharge: HOME | End: 2018-10-21
Attending: EMERGENCY MEDICINE | Admitting: HOSPITALIST
Payer: MEDICARE

## 2018-10-19 DIAGNOSIS — R07.9 CHEST PAIN OF UNCERTAIN ETIOLOGY: ICD-10-CM

## 2018-10-19 DIAGNOSIS — N18.6 CHRONIC KIDNEY DISEASE WITH END STAGE RENAL DISEASE ON DIALYSIS DUE TO TYPE 2 DIABETES MELLITUS (HCC): ICD-10-CM

## 2018-10-19 DIAGNOSIS — R09.89 PULMONARY CONGESTION: Primary | ICD-10-CM

## 2018-10-19 DIAGNOSIS — R73.9 HYPERGLYCEMIA: ICD-10-CM

## 2018-10-19 DIAGNOSIS — E11.22 CHRONIC KIDNEY DISEASE WITH END STAGE RENAL DISEASE ON DIALYSIS DUE TO TYPE 2 DIABETES MELLITUS (HCC): ICD-10-CM

## 2018-10-19 DIAGNOSIS — Z99.2 CHRONIC KIDNEY DISEASE WITH END STAGE RENAL DISEASE ON DIALYSIS DUE TO TYPE 2 DIABETES MELLITUS (HCC): ICD-10-CM

## 2018-10-19 PROCEDURE — 99220 INITIAL OBSERVATION CARE,LEVL III: CPT | Performed by: HOSPITALIST

## 2018-10-19 PROCEDURE — 71045 X-RAY EXAM CHEST 1 VIEW: CPT

## 2018-10-19 RX ORDER — NIFEDIPINE 60 MG/1
60 TABLET, FILM COATED, EXTENDED RELEASE ORAL DAILY
COMMUNITY
End: 2019-01-01

## 2018-10-19 RX ORDER — CARVEDILOL 12.5 MG/1
12.5 TABLET ORAL 2 TIMES DAILY WITH MEALS
COMMUNITY
End: 2019-01-11

## 2018-10-19 RX ORDER — CARVEDILOL 12.5 MG/1
12.5 TABLET ORAL 2 TIMES DAILY WITH MEALS
Status: DISCONTINUED | OUTPATIENT
Start: 2018-10-19 | End: 2018-10-21

## 2018-10-19 RX ORDER — ATORVASTATIN CALCIUM 80 MG/1
80 TABLET, FILM COATED ORAL NIGHTLY
COMMUNITY
End: 2019-02-19

## 2018-10-19 RX ORDER — ISOSORBIDE MONONITRATE 30 MG/1
30 TABLET, EXTENDED RELEASE ORAL DAILY
Status: DISCONTINUED | OUTPATIENT
Start: 2018-10-20 | End: 2018-10-21

## 2018-10-19 RX ORDER — HYDROCODONE BITARTRATE AND ACETAMINOPHEN 5; 325 MG/1; MG/1
1 TABLET ORAL ONCE
Status: COMPLETED | OUTPATIENT
Start: 2018-10-19 | End: 2018-10-19

## 2018-10-19 RX ORDER — PANTOPRAZOLE SODIUM 40 MG/1
40 TABLET, DELAYED RELEASE ORAL
COMMUNITY
End: 2019-01-01 | Stop reason: ALTCHOICE

## 2018-10-19 RX ORDER — NIFEDIPINE 30 MG/1
60 TABLET, EXTENDED RELEASE ORAL DAILY
Status: DISCONTINUED | OUTPATIENT
Start: 2018-10-20 | End: 2018-10-21

## 2018-10-19 RX ORDER — POLYETHYLENE GLYCOL 3350 17 G/17G
17 POWDER, FOR SOLUTION ORAL DAILY
Status: DISCONTINUED | OUTPATIENT
Start: 2018-10-20 | End: 2018-10-21

## 2018-10-19 RX ORDER — ACETAMINOPHEN 325 MG/1
650 TABLET ORAL EVERY 6 HOURS PRN
Status: DISCONTINUED | OUTPATIENT
Start: 2018-10-19 | End: 2018-10-21

## 2018-10-19 RX ORDER — NITROGLYCERIN 0.4 MG/1
0.4 TABLET SUBLINGUAL EVERY 5 MIN PRN
Status: DISCONTINUED | OUTPATIENT
Start: 2018-10-19 | End: 2018-10-21

## 2018-10-19 RX ORDER — DEXTROSE MONOHYDRATE 25 G/50ML
50 INJECTION, SOLUTION INTRAVENOUS
Status: DISCONTINUED | OUTPATIENT
Start: 2018-10-19 | End: 2018-10-21

## 2018-10-19 RX ORDER — INSULIN ASPART 100 [IU]/ML
27 INJECTION, SOLUTION INTRAVENOUS; SUBCUTANEOUS ONCE
Status: COMPLETED | OUTPATIENT
Start: 2018-10-19 | End: 2018-10-19

## 2018-10-19 RX ORDER — ASPIRIN 81 MG/1
81 TABLET ORAL DAILY
Status: DISCONTINUED | OUTPATIENT
Start: 2018-10-20 | End: 2018-10-21

## 2018-10-19 RX ORDER — PROMETHAZINE HYDROCHLORIDE 25 MG/1
25 TABLET ORAL EVERY 8 HOURS PRN
Status: DISCONTINUED | OUTPATIENT
Start: 2018-10-19 | End: 2018-10-21

## 2018-10-19 RX ORDER — TRAMADOL HYDROCHLORIDE 50 MG/1
50 TABLET ORAL EVERY 12 HOURS PRN
Status: DISCONTINUED | OUTPATIENT
Start: 2018-10-19 | End: 2018-10-20

## 2018-10-19 RX ORDER — HEPARIN SODIUM 5000 [USP'U]/ML
5000 INJECTION, SOLUTION INTRAVENOUS; SUBCUTANEOUS EVERY 8 HOURS SCHEDULED
Status: DISCONTINUED | OUTPATIENT
Start: 2018-10-19 | End: 2018-10-21

## 2018-10-19 RX ORDER — FUROSEMIDE 40 MG/1
40 TABLET ORAL
Status: DISCONTINUED | OUTPATIENT
Start: 2018-10-19 | End: 2018-10-21

## 2018-10-19 RX ORDER — INSULIN ASPART 100 [IU]/ML
20 INJECTION, SOLUTION INTRAVENOUS; SUBCUTANEOUS ONCE
Status: DISCONTINUED | OUTPATIENT
Start: 2018-10-19 | End: 2018-10-19

## 2018-10-19 RX ORDER — SEVELAMER CARBONATE 800 MG/1
1600 TABLET, FILM COATED ORAL 3 TIMES DAILY
Status: DISCONTINUED | OUTPATIENT
Start: 2018-10-19 | End: 2018-10-21

## 2018-10-19 RX ORDER — ATORVASTATIN CALCIUM 80 MG/1
80 TABLET, FILM COATED ORAL NIGHTLY
Status: DISCONTINUED | OUTPATIENT
Start: 2018-10-19 | End: 2018-10-21

## 2018-10-19 RX ORDER — SENNA AND DOCUSATE SODIUM 50; 8.6 MG/1; MG/1
1 TABLET, FILM COATED ORAL 2 TIMES DAILY
Status: DISCONTINUED | OUTPATIENT
Start: 2018-10-19 | End: 2018-10-21

## 2018-10-19 RX ORDER — PANTOPRAZOLE SODIUM 40 MG/1
40 TABLET, DELAYED RELEASE ORAL
Status: DISCONTINUED | OUTPATIENT
Start: 2018-10-20 | End: 2018-10-21

## 2018-10-19 NOTE — ED PROVIDER NOTES
Patient Seen in: [facility] Emergency Department    History   Patient presents with:  Chest Pain Angina (cardiovascular)    Stated Complaint: chest pain    HPI    CHIEF COMPLAINT: Chest pain    HISTORY OF PRESENT ILLNESS:    Patient is a 21-year-old circumflex obtuse marginal branch angioplasty with a drug-eluting stent in December 2009, status post PCI to the LAD with 2 drug-eluting stents diagonal with one drug-eluting stent in July 2016 and subsequent angioplasty of the ostial/proximal LAD with a d Secondary hyperparathyroidism of renal origin [facility])    • TIA (transient ischemic attack)    • Uncontrolled type II diabetes mellitus with nephropathy ([facility] [address] 11/30/2016       Past Surgical History:   Procedure Laterality Date   • [facility]. Gait normal.  Skin:  warm and dry, no rashes. No jaundice. Brisk capillary refill  Musculoskeletal: neck is supple, no lymphadenopathy, non tender. no meningeal signs. Extremities are symmetrical, full range of motion.   Right BKA  Psychiat elevated at 443, BUN 68, creatinine 7, calcium 8.0 and alk phos of 136, troponin negative. Patient's CBC revealed a hemoglobin of 9.3, normal white blood cell count.     Xr Chest Ap Portable  (cpt=71045)    Result Date: 10/19/2018  CONCLUSION:  Parminder [facility])  Hyperglycemia    Disposition:  Admit  10/19/2018  6:41 pm    Follow-up:  No follow-up provider specified.       Medications Prescribed:  Current Discharge Medication List        Present on Admission  Date Reviewed: 10/4/2018          ICD-10-CM Noted

## 2018-10-19 NOTE — H&P
[facility] HOSPITAL[facility]  History and Physical     Marti Carol Patient Status:  Emergency    3/20/1965 MRN [id number]   Location [facility] Attending Pati Guerrero MD   Hosp Day # 0 PCP Julius Farris DO     Chief Complai LAD/diagonal bifurcation with balloon angioplasty .     • Charcot's joint of foot     Status post right leg BKA prior osteomyelitis November 2016 of the stump   • Chronic kidney disease with end stage renal failure on dialysis [facility])    • Depression    • Eso History:  reports that she quit smoking about 22 months ago. Her smoking use included cigarettes. She has a 20.00 pack-year smoking history. she has never used smokeless tobacco. She reports that she does not drink alcohol or use drugs.   Family History: CARVEDILOL 12.5 MG Oral Tab TAKE ONE TABLET BY MOUTH TWICE DAILY Disp: 60 tablet Rfl: 3   Glucose Blood (ONETOUCH ULTRA BLUE) In Vitro Strip USE ONE STRIP TO CHECK GLUCOSE THREE TIMES DAILY.  Dx: E11.29; E11.65 Disp: 200 strip Rfl: 7   NIFEDIPINE ER 60 MG input(s): PTP, INR in the last 168 hours. Recent Labs   Lab  10/19/18   1618   TROP  <0.046     Imaging: Imaging data reviewed in Epic. ASSESSMENT / PLAN:   1. Chest pain and SOB- atypical pain but some relief with nitro.   I suspect acute pulmonary edema

## 2018-10-19 NOTE — ED INITIAL ASSESSMENT (HPI)
Chest pain since 2:45pm. Brought by medics from [facility]. Took 2 nitro SL en route. Got 4 aspirin before arrival. Normally on O2 at 2lpm PRN. Pt states she using O2 all the time.

## 2018-10-20 ENCOUNTER — APPOINTMENT (OUTPATIENT)
Dept: CV DIAGNOSTICS | Facility: HOSPITAL | Age: 53
End: 2018-10-20
Attending: HOSPITALIST
Payer: MEDICARE

## 2018-10-20 PROBLEM — R07.9 CHEST PAIN: Status: ACTIVE | Noted: 2018-10-19

## 2018-10-20 PROCEDURE — 99225 SUBSEQUENT OBSERVATION CARE: CPT | Performed by: STUDENT IN AN ORGANIZED HEALTH CARE EDUCATION/TRAINING PROGRAM

## 2018-10-20 PROCEDURE — 93307 TTE W/O DOPPLER COMPLETE: CPT | Performed by: HOSPITALIST

## 2018-10-20 PROCEDURE — 99203 OFFICE O/P NEW LOW 30 MIN: CPT | Performed by: INTERNAL MEDICINE

## 2018-10-20 RX ORDER — HYDROCODONE BITARTRATE AND ACETAMINOPHEN 5; 325 MG/1; MG/1
1 TABLET ORAL EVERY 6 HOURS PRN
Status: DISCONTINUED | OUTPATIENT
Start: 2018-10-20 | End: 2018-10-21

## 2018-10-20 NOTE — PLAN OF CARE
NURSING ADMISSION NOTE      Patient admitted via CART. Oriented to room. Safety precautions initiated. Bed in low position. Call light in reach.   ADMITTED A 53 YRS OLD FEMALE FROM ER ALERT AND ORIENTED X4 WITH DIAGNOSIS OF PUL CONGESTION AND CHEST

## 2018-10-20 NOTE — PROGRESS NOTES
[facility]PITAL[facility]  Progress Note     Vicente Yu Patient Status:  Observation    3/20/1965 MRN [id number]   [facility] 8NE-A Attending Bubba Gregory MD   Hosp Day # 0 PCP Sebastian Calloway DO     Chief Complaint: CP and SPB     S: P • Isosorbide Mononitrate ER  30 mg Oral Daily   • NIFEdipine ER  60 mg Oral Daily   • Pantoprazole Sodium  40 mg Oral BID AC   • PEG 3350  17 g Oral Daily   • Sevelamer Carbonate  1,600 mg Oral TID   • Senna-Docusate Sodium  1 tablet Oral BID   • Heparin

## 2018-10-20 NOTE — CONSULTS
[facility]    Report of Consultation    Shana Nguyen Patient Status:  Observation    3/20/1965 MRN [id number]   [facility] 8NE-A Attending Danna Keller MD   Hosp Day # 0 PCP Bowen Bess,      Date of Admission:  10/19/20 breath so she came to the hospital for evaluation. Patient is normally dialyzed on Monday Wednesday and Friday but did not receive dialysis today because of her doctor's visits. Patient symptoms resolved and she is presently more comfortable.   She states • History of DVT (deep vein thrombosis)     right upper arm approx 2010, hosp at [facility]   • Hx of necrotizing fasciitis     right groin, Jude did surgery 6/2014   • Hx of right BKA ([facility] [address]    • Hyperlipidemia    • Ischemic dilated cardiomyopathy ([facility] [address] None on file            Current Medications:          Current Facility-Administered Medications:  aspirin EC tab 81 mg 81 mg Oral Daily   atorvastatin (LIPITOR) tab 80 mg 80 mg Oral Nightly   ticagrelor (BRILINTA) tab TABS 90 mg 90 mg Oral BID   carvedilol insulin glargine 100 UNIT/ML Subcutaneous Solution Inject 35 Units into the skin 2 (two) times daily. NIFEdipine ER 60 MG Oral Tablet 24 Hr Take 60 mg by mouth daily.    Pantoprazole Sodium 40 MG Oral Tab EC Take 40 mg by mouth 2 (two) times daily befor Nightly   • ticagrelor  90 mg Oral BID   • carvedilol  12.5 mg Oral BID with meals   • furosemide  40 mg Oral BID (Diuretic)   • insulin detemir  35 Units Subcutaneous 2 times per day   • Isosorbide Mononitrate ER  30 mg Oral Daily   • NIFEdipine ER  60 mg (cpt=71045)    Result Date: 10/19/2018  CONCLUSION:  Cardiomegaly with pulmonary venous congestion. Slight infiltrate versus atelectasis retrocardiac left lung base with stable left pleural effusion and mild blunting right costophrenic angle.   No pneumoth

## 2018-10-20 NOTE — CONSULTS
[facility]  Report of Consultation    Maryellen Carpio Patient Status:  Observation    3/20/1965 MRN [id number]   [facility] 8NE-A Attending Ry Kan MD   Hosp Day # 0 PCP Neena Headley DO     Reason for Consultation:  ESRD Ejection fraction of 45%   • Neuropathy    • Obesity, unspecified    • Osteomyelitis of right tibia [facility])     MRI at [facility] November 2016.   Dr. Chasity Conroy following from infectious disease   • PAD (peripheral artery disease) ([facility] [address]     Status post successful com tablet, Oral, Q6H PRN  •  aspirin EC tab 81 mg, 81 mg, Oral, Daily  •  atorvastatin (LIPITOR) tab 80 mg, 80 mg, Oral, Nightly  •  ticagrelor (BRILINTA) tab TABS 90 mg, 90 mg, Oral, BID  •  carvedilol (COREG) tab 12.5 mg, 12.5 mg, Oral, BID with meals  •  f N/V/D  Denies change in urinary habits or gross hematuria  Denies LE edema  Denies skin rashes/myalgias/arthralgias      Physical Exam:   BP (!) 166/74 (BP Location: Right arm)   Pulse 78   Temp 97.9 °F (36.6 °C) (Oral)   Resp 25   Ht 72\"   Wt (!) 300 lb CREATININE   Date Value   07/24/2017 3.51 mg/dL   06/16/2017 3.14 mg/dL   09/19/2016 1.4   09/19/2016 1.4   05/20/2016 1.48     Creatinine (mg/dL)   Date Value   10/19/2018 7.02 (H)   11/17/2017 4.29 (H)   01/04/2017 2.26 (H)       Malb/Cre Calc   Date

## 2018-10-20 NOTE — HISTORICAL OFFICE NOTE
Juan Abad  : 1965  ACCOUNT:  [id number]  [phone]  PCP: Dr. Ngoc Shahid     TODAY'S DATE: 10/19/2018  DICTATED BY:  [Dr. Amaya [facility]: [facility]    HPI:    [On 10/19/2018, Cameron Watts, a 47year old female, rashes, lesions. MS: no limiting arthritis. NEURO: no localized deficits. HEM/LYMPH: denies easy bruising. ALL: no new food or enviornmental allergies.       PAST HISTORY: sleep apnea, necrotizing fascitis 6/2014 and amputation of right foot    PAST CV HIST I outlined expectations and natural history of her peripheral arterial disease, given her poorly controlled diabetes, her end-stage renal disease. Amputation certainly is a very real possibility.   At the moment, she has no rest pain and had no new wounds Pantoprazole Sodium   40MG      1 tablet by mouth twice daily            10/19/18 Polyethylene Glycol 33          AS DIRECTED                              10/19/18 Renvela               800MG     2 tablets by mouth 3 times daily

## 2018-10-20 NOTE — PLAN OF CARE
Problem: CARDIOVASCULAR - ADULT  Goal: Maintains optimal cardiac output and hemodynamic stability  INTERVENTIONS:  - Monitor vital signs, rhythm, and trends  - Monitor for bleeding, hypotension and signs of decreased cardiac output  - Evaluate effectivenes continue POC.

## 2018-10-20 NOTE — PROGRESS NOTES
· [facility] Card[facility] Progress Note     Subjective:  Dyspnea is better on HD.       Objective:  118/54  167/67 earlier  Afebrile  SR    BUN/ cr 68/7.02  Troponin 0.076  Hgb 9.3    Cardiac: S1 S2 regular  Lungs: decreased BS  Abdomen: soft  Extremities:

## 2018-10-21 VITALS
OXYGEN SATURATION: 93 % | SYSTOLIC BLOOD PRESSURE: 141 MMHG | TEMPERATURE: 98 F | RESPIRATION RATE: 18 BRPM | BODY MASS INDEX: 39.68 KG/M2 | WEIGHT: 293 LBS | DIASTOLIC BLOOD PRESSURE: 57 MMHG | HEART RATE: 61 BPM | HEIGHT: 72 IN

## 2018-10-21 PROCEDURE — 99217 OBSERVATION CARE DISCHARGE: CPT | Performed by: STUDENT IN AN ORGANIZED HEALTH CARE EDUCATION/TRAINING PROGRAM

## 2018-10-21 PROCEDURE — 99214 OFFICE O/P EST MOD 30 MIN: CPT | Performed by: INTERNAL MEDICINE

## 2018-10-21 NOTE — PROGRESS NOTES
[facility]  Progress Note     Aisha Stern Patient Status:  Observation    3/20/1965 MRN [id number]   [facility] 8NE-A Attending Earline Briggs MD   Hosp Day # 0 PCP Wendel Alpers, DO     Chief Complaint: CP and SPB     S: P • insulin detemir  35 Units Subcutaneous 2 times per day   • Isosorbide Mononitrate ER  30 mg Oral Daily   • NIFEdipine ER  60 mg Oral Daily   • Pantoprazole Sodium  40 mg Oral BID AC   • PEG 3350  17 g Oral Daily   • Sevelamer Carbonate  1,600 mg Oral T

## 2018-10-21 NOTE — PROGRESS NOTES
[facility]  Nephrology Progress Note    Ranjit Hilario Patient Status:  Observation    3/20/1965 MRN [id number]   [facility] 8NE-A Attending Ayesha Mendoza MD   Hosp Day # 0 PCP Kenny Anthony DO       SUBJECTIVE:  No events over 204*  195*  141*  172*       Meds:     Current Facility-Administered Medications:  HYDROcodone-acetaminophen (NORCO) 5-325 MG per tab 1 tablet 1 tablet Oral Q6H PRN   aspirin EC tab 81 mg 81 mg Oral Daily   atorvastatin (LIPITOR) tab 80 mg 80 mg Oral Night HTN- cont usual meds and follow. BP stable this AM    #4. CP- in setting of CAD. med mgmt per cards         Questions/concerns were discussed with patient and/or family by bedside.           Shivani Maloney  10/21/2018  7:32 AM

## 2018-10-21 NOTE — PROGRESS NOTES
· [facility] Progress Note     Subjective:  Planning for discharge. Dyspnea resolved after HD.       Objective:  141/57  Afebrile  SR    BUN/ cr 37/5.11  Hgb 8.9    Cardiac: S1 S2 regular  Lungs: decreased BS  Abdomen: soft  Extremities: right

## 2018-10-21 NOTE — PLAN OF CARE
Diabetes/Glucose Control    • Glucose maintained within prescribed range Progressing        METABOLIC/FLUID AND ELECTROLYTES - ADULT    • Hemodynamic stability and optimal renal function maintained Progressing          Per pt slept well last night, breathi

## 2018-10-21 NOTE — DISCHARGE SUMMARY
[facility]  DISCHARGE SUMMARY     Laila Rodriguez Patient Status:  Observation    3/20/1965 MRN [id number]   [facility] 8NE-A Attending Lyndsey Matute MD   Hosp Day # 0 PCP Allie Santo DO     Date of Admission: 10/19/2018  D disease with skin ulcers on the left lower extremity    Brief Synopsis:   The pt was admitted to the hospital- deemed SOB due to fluid overload- pt had missed her scheduled HD. She underwent  HD was in the hospital with symptomatic improvement.  Her chest p 0     Pantoprazole Sodium 40 MG Tbec  Commonly known as:  PROTONIX      Take 40 mg by mouth 2 (two) times daily before meals. Refills:  0     Polyethylene Glycol 3350 Pack  Commonly known as:  MIRALAX      Take 17 g by mouth daily as needed.    Refills:

## 2018-10-22 NOTE — PROGRESS NOTES
NURSING DISCHARGE NOTE      Per nursing supervisor Jessa Ray, approval was given for patient to be DC'd with EDW oxygen tank. Patient verbalized understanding to return to [address] [place] [address]. Patient has home O2 of 2L NC. Verbalized understanding of DC instructions.  IV

## 2018-10-23 ENCOUNTER — PATIENT OUTREACH (OUTPATIENT)
Dept: CASE MANAGEMENT | Age: 53
End: 2018-10-23

## 2018-10-23 LAB
BUN: 71 MG/DL
CALCIUM: 8.6 MG/DL
CHLORIDE: 100 MEQ/L
CHOLESTEROL, TOTAL: 1752 MG/DL
CREATININE, SERUM: 7.01 MG/DL
GLUCOSE: 339 MG/DL
HDL CHOLESTEROL: 26 MG/DL
HEMATOCRIT: 27.2 %
HEMOGLOBIN A1C: 10.3 %
HEMOGLOBIN: 9.1 G/DL
LDL CHOLESTEROL: 77 MG/DL
PLATELETS: 212 K/UL
POTASSIUM, SERUM: 5.2 MEQ/L
RED BLOOD COUNT: 2.69 X 10-6/U
SODIUM: 137 MEQ/L
TRIGLYCERIDES: 571 MG/DL
WHITE BLOOD COUNT: 8.3 X 10-3/U

## 2018-10-24 ENCOUNTER — PATIENT OUTREACH (OUTPATIENT)
Dept: FAMILY MEDICINE CLINIC | Facility: CLINIC | Age: 53
End: 2018-10-24

## 2018-10-25 LAB
BUN: 26 MG/DL
CALCIUM: 8.7 MG/DL
CHLORIDE: 100 MEQ/L
CHOLESTEROL, TOTAL: 149 MG/DL
CREATININE, SERUM: 4.29 MG/DL
GLUCOSE: 153 MG/DL
HDL CHOLESTEROL: 35 MG/DL
HEMOGLOBIN A1C: 9.7 %
HEMOGLOBIN A1C: 9.8 %
LDL CHOLESTEROL: 70 MG/DL
POTASSIUM, SERUM: 3.1 MEQ/L
SODIUM: 141 MEQ/L
TRIGLYCERIDES: 222 MG/DL

## 2018-10-26 ENCOUNTER — CHARTING TRANS (OUTPATIENT)
Dept: OTHER | Age: 53
End: 2018-10-26

## 2018-11-15 ENCOUNTER — PATIENT OUTREACH (OUTPATIENT)
Dept: FAMILY MEDICINE CLINIC | Facility: CLINIC | Age: 53
End: 2018-11-15

## 2018-11-23 ENCOUNTER — IMAGING SERVICES (OUTPATIENT)
Dept: OTHER | Age: 53
End: 2018-11-23

## 2018-11-23 ENCOUNTER — TELEPHONE (OUTPATIENT)
Dept: FAMILY MEDICINE CLINIC | Facility: CLINIC | Age: 53
End: 2018-11-23

## 2018-11-27 ENCOUNTER — TELEPHONE (OUTPATIENT)
Dept: FAMILY MEDICINE CLINIC | Facility: CLINIC | Age: 53
End: 2018-11-27

## 2018-11-27 VITALS
HEART RATE: 58 BPM | BODY MASS INDEX: 41.02 KG/M2 | HEIGHT: 71 IN | SYSTOLIC BLOOD PRESSURE: 108 MMHG | DIASTOLIC BLOOD PRESSURE: 58 MMHG | RESPIRATION RATE: 16 BRPM | OXYGEN SATURATION: 96 % | WEIGHT: 293 LBS

## 2018-11-27 NOTE — TELEPHONE ENCOUNTER
Scheduled to start seeing her today, made up dialysis appt today. Then at 2pm was notified patient was at emergency room. Unable to see her today for start of care.

## 2018-11-28 ENCOUNTER — EXTERNAL RECORD (OUTPATIENT)
Dept: NEPHROLOGY | Age: 53
End: 2018-11-28

## 2018-11-28 ENCOUNTER — EXTERNAL RECORD (OUTPATIENT)
Dept: OTHER | Age: 53
End: 2018-11-28

## 2018-11-28 VITALS
HEART RATE: 76 BPM | DIASTOLIC BLOOD PRESSURE: 68 MMHG | RESPIRATION RATE: 19 BRPM | WEIGHT: 293 LBS | SYSTOLIC BLOOD PRESSURE: 144 MMHG | HEIGHT: 71 IN | BODY MASS INDEX: 41.02 KG/M2

## 2018-11-28 VITALS
HEART RATE: 110 BPM | DIASTOLIC BLOOD PRESSURE: 82 MMHG | TEMPERATURE: 98.7 F | RESPIRATION RATE: 20 BRPM | SYSTOLIC BLOOD PRESSURE: 152 MMHG

## 2018-11-28 VITALS
RESPIRATION RATE: 16 BRPM | OXYGEN SATURATION: 96 % | BODY MASS INDEX: 41.02 KG/M2 | WEIGHT: 293 LBS | HEART RATE: 65 BPM | DIASTOLIC BLOOD PRESSURE: 50 MMHG | HEIGHT: 71 IN | SYSTOLIC BLOOD PRESSURE: 100 MMHG

## 2018-11-28 VITALS
HEIGHT: 71 IN | SYSTOLIC BLOOD PRESSURE: 148 MMHG | DIASTOLIC BLOOD PRESSURE: 70 MMHG | HEART RATE: 80 BPM | RESPIRATION RATE: 20 BRPM

## 2018-11-28 VITALS
HEART RATE: 67 BPM | DIASTOLIC BLOOD PRESSURE: 74 MMHG | WEIGHT: 293 LBS | RESPIRATION RATE: 16 BRPM | SYSTOLIC BLOOD PRESSURE: 124 MMHG | OXYGEN SATURATION: 95 % | HEIGHT: 71 IN | BODY MASS INDEX: 41.02 KG/M2

## 2018-11-28 VITALS
WEIGHT: 293 LBS | HEIGHT: 71 IN | DIASTOLIC BLOOD PRESSURE: 62 MMHG | SYSTOLIC BLOOD PRESSURE: 156 MMHG | RESPIRATION RATE: 22 BRPM | HEART RATE: 88 BPM | BODY MASS INDEX: 41.02 KG/M2

## 2018-11-28 VITALS
HEIGHT: 71 IN | WEIGHT: 293 LBS | BODY MASS INDEX: 41.02 KG/M2 | HEART RATE: 68 BPM | SYSTOLIC BLOOD PRESSURE: 134 MMHG | RESPIRATION RATE: 17 BRPM | DIASTOLIC BLOOD PRESSURE: 52 MMHG

## 2018-11-28 VITALS
DIASTOLIC BLOOD PRESSURE: 63 MMHG | TEMPERATURE: 98.6 F | SYSTOLIC BLOOD PRESSURE: 147 MMHG | HEART RATE: 72 BPM | RESPIRATION RATE: 20 BRPM

## 2018-11-28 VITALS
TEMPERATURE: 99.6 F | HEART RATE: 98 BPM | SYSTOLIC BLOOD PRESSURE: 138 MMHG | RESPIRATION RATE: 20 BRPM | DIASTOLIC BLOOD PRESSURE: 78 MMHG

## 2018-11-29 ENCOUNTER — EXTERNAL RECORD (OUTPATIENT)
Dept: PODIATRY | Age: 53
End: 2018-11-29

## 2018-11-29 VITALS — RESPIRATION RATE: 18 BRPM | HEART RATE: 72 BPM | DIASTOLIC BLOOD PRESSURE: 70 MMHG | SYSTOLIC BLOOD PRESSURE: 142 MMHG

## 2018-11-29 VITALS
HEIGHT: 71 IN | WEIGHT: 277 LBS | BODY MASS INDEX: 38.78 KG/M2 | SYSTOLIC BLOOD PRESSURE: 122 MMHG | HEART RATE: 60 BPM | DIASTOLIC BLOOD PRESSURE: 52 MMHG | RESPIRATION RATE: 15 BRPM

## 2018-11-30 ENCOUNTER — EXTERNAL RECORD (OUTPATIENT)
Dept: OTHER | Age: 53
End: 2018-11-30

## 2018-11-30 ENCOUNTER — CHARTING TRANS (OUTPATIENT)
Dept: OTHER | Age: 53
End: 2018-11-30

## 2018-12-01 ENCOUNTER — IMAGING SERVICES (OUTPATIENT)
Dept: OTHER | Age: 53
End: 2018-12-01

## 2018-12-03 ENCOUNTER — TELEPHONE (OUTPATIENT)
Dept: FAMILY MEDICINE CLINIC | Facility: CLINIC | Age: 53
End: 2018-12-03

## 2018-12-03 NOTE — TELEPHONE ENCOUNTER
NEED CONFIRMATION THAT DR [facility] WILL MANAGE PT HOME HEALTH CARE. PLEASE CALL. ( PHONES ARE TURNED OFF AT 4:30PM)

## 2018-12-04 ENCOUNTER — TELEPHONE (OUTPATIENT)
Dept: CARDIOLOGY | Age: 53
End: 2018-12-04

## 2018-12-04 DIAGNOSIS — Z79.4 DIABETES MELLITUS DUE TO UNDERLYING CONDITION WITH DIABETIC PERIPHERAL ANGIOPATHY AND GANGRENE, WITH LONG-TERM CURRENT USE OF INSULIN (CMD): ICD-10-CM

## 2018-12-04 DIAGNOSIS — E08.52 DIABETES MELLITUS DUE TO UNDERLYING CONDITION WITH DIABETIC PERIPHERAL ANGIOPATHY AND GANGRENE, WITH LONG-TERM CURRENT USE OF INSULIN (CMD): ICD-10-CM

## 2018-12-04 DIAGNOSIS — R93.6 ABNORMAL FINDINGS ON DIAGNOSTIC IMAGING OF LIMBS: ICD-10-CM

## 2018-12-04 DIAGNOSIS — I73.9 PVD (PERIPHERAL VASCULAR DISEASE) (CMD): Primary | ICD-10-CM

## 2018-12-05 ENCOUNTER — TELEPHONE (OUTPATIENT)
Dept: PODIATRY | Age: 53
End: 2018-12-05

## 2018-12-06 ENCOUNTER — IMAGING SERVICES (OUTPATIENT)
Dept: GENERAL RADIOLOGY | Age: 53
End: 2018-12-06
Attending: PODIATRIST

## 2018-12-06 ENCOUNTER — OFFICE VISIT (OUTPATIENT)
Dept: CARDIOLOGY | Age: 53
End: 2018-12-06

## 2018-12-06 ENCOUNTER — TELEPHONE (OUTPATIENT)
Dept: FAMILY MEDICINE CLINIC | Facility: CLINIC | Age: 53
End: 2018-12-06

## 2018-12-06 ENCOUNTER — OFFICE VISIT (OUTPATIENT)
Dept: PODIATRY | Age: 53
End: 2018-12-06

## 2018-12-06 ENCOUNTER — LAB SERVICES (OUTPATIENT)
Dept: LAB | Age: 53
End: 2018-12-06

## 2018-12-06 VITALS
HEART RATE: 66 BPM | WEIGHT: 293 LBS | DIASTOLIC BLOOD PRESSURE: 74 MMHG | RESPIRATION RATE: 14 BRPM | SYSTOLIC BLOOD PRESSURE: 110 MMHG | OXYGEN SATURATION: 91 % | HEIGHT: 72 IN | BODY MASS INDEX: 39.68 KG/M2

## 2018-12-06 DIAGNOSIS — Z99.2 STAGE 5 CHRONIC KIDNEY DISEASE ON CHRONIC DIALYSIS (CMD): ICD-10-CM

## 2018-12-06 DIAGNOSIS — R09.02 HYPOXIA: Primary | ICD-10-CM

## 2018-12-06 DIAGNOSIS — I73.9 PERIPHERAL VASCULAR DISEASE (CMD): ICD-10-CM

## 2018-12-06 DIAGNOSIS — E11.40 TYPE 2 DIABETES MELLITUS WITH DIABETIC NEUROPATHY, WITH LONG-TERM CURRENT USE OF INSULIN (CMD): ICD-10-CM

## 2018-12-06 DIAGNOSIS — R06.02 SOB (SHORTNESS OF BREATH): ICD-10-CM

## 2018-12-06 DIAGNOSIS — I25.10 CORONARY ARTERY DISEASE INVOLVING NATIVE CORONARY ARTERY OF NATIVE HEART WITHOUT ANGINA PECTORIS: Primary | ICD-10-CM

## 2018-12-06 DIAGNOSIS — E78.5 HYPERLIPIDEMIA, UNSPECIFIED HYPERLIPIDEMIA TYPE: ICD-10-CM

## 2018-12-06 DIAGNOSIS — E08.52 DIABETES MELLITUS DUE TO UNDERLYING CONDITION WITH DIABETIC PERIPHERAL ANGIOPATHY AND GANGRENE, WITH LONG-TERM CURRENT USE OF INSULIN (CMD): ICD-10-CM

## 2018-12-06 DIAGNOSIS — I10 ESSENTIAL HYPERTENSION: ICD-10-CM

## 2018-12-06 DIAGNOSIS — Z79.4 TYPE 2 DIABETES MELLITUS WITH DIABETIC NEUROPATHY, WITH LONG-TERM CURRENT USE OF INSULIN (CMD): ICD-10-CM

## 2018-12-06 DIAGNOSIS — R09.89 PULMONARY CONGESTION: ICD-10-CM

## 2018-12-06 DIAGNOSIS — Z79.4 DIABETES MELLITUS DUE TO UNDERLYING CONDITION WITH DIABETIC PERIPHERAL ANGIOPATHY AND GANGRENE, WITH LONG-TERM CURRENT USE OF INSULIN (CMD): ICD-10-CM

## 2018-12-06 DIAGNOSIS — N18.6 STAGE 5 CHRONIC KIDNEY DISEASE ON CHRONIC DIALYSIS (CMD): ICD-10-CM

## 2018-12-06 DIAGNOSIS — I73.9 PVD (PERIPHERAL VASCULAR DISEASE) (CMD): ICD-10-CM

## 2018-12-06 DIAGNOSIS — I50.1 PULMONARY EDEMA CARDIAC CAUSE (HCC): ICD-10-CM

## 2018-12-06 DIAGNOSIS — T14.8XXA BLOOD BLISTER: ICD-10-CM

## 2018-12-06 DIAGNOSIS — I73.9 PVD (PERIPHERAL VASCULAR DISEASE) (CMD): Primary | ICD-10-CM

## 2018-12-06 DIAGNOSIS — R93.6 ABNORMAL FINDINGS ON DIAGNOSTIC IMAGING OF LIMBS: ICD-10-CM

## 2018-12-06 DIAGNOSIS — I25.118 CORONARY ARTERY DISEASE OF NATIVE ARTERY OF NATIVE HEART WITH STABLE ANGINA PECTORIS (CMD): ICD-10-CM

## 2018-12-06 LAB
BASOPHIL %: 0.3 % (ref 0–1.2)
BASOPHIL ABSOLUTE #: 0 10*3/UL (ref 0–0.1)
DIFFERENTIAL TYPE: ABNORMAL
EOSINOPHIL %: 2.4 % (ref 0–10)
EOSINOPHIL ABSOLUTE #: 0.2 10*3/UL (ref 0–0.5)
HEMATOCRIT: 35.1 % (ref 34–45)
HEMOGLOBIN: 11.1 G/DL (ref 11.2–15.7)
LYMPH PERCENT: 14.3 % (ref 20.5–51.1)
LYMPHOCYTE ABSOLUTE #: 1.1 10*3/UL (ref 1.2–3.4)
MEAN CORPUSCULAR HGB CONCENTRATION: 31.6 % (ref 32–36)
MEAN CORPUSCULAR HGB: 34.5 PG (ref 27–34)
MEAN CORPUSCULAR VOLUME: 109 FL (ref 79–95)
MEAN PLATELET VOLUME: 10.2 FL (ref 8.6–12.4)
MONOCYTE ABSOLUTE #: 0.8 10*3/UL (ref 0.2–0.9)
MONOCYTE PERCENT: 10.2 % (ref 4.3–12.9)
NEUTROPHIL ABSOLUTE #: 5.8 10*3/UL (ref 1.4–6.5)
NEUTROPHIL PERCENT: 72.8 % (ref 34–73.5)
PLATELET COUNT: 235 10*3/UL (ref 150–400)
PTT: 24.7 S (ref 24–38)
RED BLOOD CELL COUNT: 3.22 10*6/UL (ref 3.7–5.2)
RED CELL DISTRIBUTION WIDTH: 14.3 % (ref 11.3–14.8)
WHITE BLOOD CELL COUNT: 7.9 10*3/UL (ref 4–10)

## 2018-12-06 PROCEDURE — 85730 THROMBOPLASTIN TIME PARTIAL: CPT | Performed by: INTERNAL MEDICINE

## 2018-12-06 PROCEDURE — 99203 OFFICE O/P NEW LOW 30 MIN: CPT | Performed by: PODIATRIST

## 2018-12-06 PROCEDURE — 3074F SYST BP LT 130 MM HG: CPT | Performed by: INTERNAL MEDICINE

## 2018-12-06 PROCEDURE — 73630 X-RAY EXAM OF FOOT: CPT | Performed by: RADIOLOGY

## 2018-12-06 PROCEDURE — 93000 ELECTROCARDIOGRAM COMPLETE: CPT | Performed by: INTERNAL MEDICINE

## 2018-12-06 PROCEDURE — 3074F SYST BP LT 130 MM HG: CPT | Performed by: PODIATRIST

## 2018-12-06 PROCEDURE — 85025 COMPLETE CBC W/AUTO DIFF WBC: CPT | Performed by: INTERNAL MEDICINE

## 2018-12-06 PROCEDURE — 85610 PROTHROMBIN TIME: CPT | Performed by: INTERNAL MEDICINE

## 2018-12-06 PROCEDURE — 36415 COLL VENOUS BLD VENIPUNCTURE: CPT | Performed by: INTERNAL MEDICINE

## 2018-12-06 PROCEDURE — 99215 OFFICE O/P EST HI 40 MIN: CPT | Performed by: INTERNAL MEDICINE

## 2018-12-06 PROCEDURE — 87205 SMEAR GRAM STAIN: CPT | Performed by: PODIATRIST

## 2018-12-06 PROCEDURE — 87070 CULTURE OTHR SPECIMN AEROBIC: CPT | Performed by: PODIATRIST

## 2018-12-06 PROCEDURE — 87075 CULTR BACTERIA EXCEPT BLOOD: CPT | Performed by: PODIATRIST

## 2018-12-06 PROCEDURE — 10140 I&D HMTMA SEROMA/FLUID COLLJ: CPT | Performed by: PODIATRIST

## 2018-12-06 PROCEDURE — 3078F DIAST BP <80 MM HG: CPT | Performed by: PODIATRIST

## 2018-12-06 PROCEDURE — 3078F DIAST BP <80 MM HG: CPT | Performed by: INTERNAL MEDICINE

## 2018-12-06 RX ORDER — FUROSEMIDE 40 MG/1
40 TABLET ORAL
COMMUNITY
Start: 2018-10-01

## 2018-12-06 RX ORDER — LISINOPRIL 10 MG/1
10 TABLET ORAL
COMMUNITY
Start: 2014-07-04

## 2018-12-06 RX ORDER — INSULIN GLARGINE 100 [IU]/ML
35 INJECTION, SOLUTION SUBCUTANEOUS
COMMUNITY
Start: 2014-07-04

## 2018-12-06 RX ORDER — IBUPROFEN 200 MG
TABLET ORAL
COMMUNITY
Start: 2017-10-23

## 2018-12-06 RX ORDER — CARVEDILOL 12.5 MG/1
12.5 TABLET ORAL
COMMUNITY
Start: 2016-11-28

## 2018-12-06 RX ORDER — NITROGLYCERIN 0.4 MG/1
0.4 TABLET SUBLINGUAL
COMMUNITY
Start: 2017-03-15 | End: 2019-01-01 | Stop reason: DRUGHIGH

## 2018-12-06 RX ORDER — SULFAMETHOXAZOLE AND TRIMETHOPRIM 800; 160 MG/1; MG/1
1 TABLET ORAL DAILY
COMMUNITY
Start: 2018-11-24 | End: 2018-12-08

## 2018-12-06 RX ORDER — INSULIN ASPART 100 [IU]/ML
20 INJECTION, SOLUTION INTRAVENOUS; SUBCUTANEOUS
COMMUNITY
Start: 2017-03-31

## 2018-12-06 RX ORDER — SEVELAMER CARBONATE 800 MG/1
1600 TABLET, FILM COATED ORAL
COMMUNITY
Start: 2017-10-30

## 2018-12-06 RX ORDER — PANTOPRAZOLE SODIUM 40 MG/1
40 TABLET, DELAYED RELEASE ORAL DAILY
COMMUNITY

## 2018-12-06 RX ORDER — ATORVASTATIN CALCIUM 80 MG/1
80 TABLET, FILM COATED ORAL
COMMUNITY
Start: 2014-07-04

## 2018-12-06 RX ORDER — ASPIRIN 81 MG/1
81 TABLET ORAL
COMMUNITY

## 2018-12-06 RX ORDER — AMOXICILLIN 250 MG
100 CAPSULE ORAL 2 TIMES DAILY
COMMUNITY

## 2018-12-06 RX ORDER — ISOSORBIDE MONONITRATE 30 MG/1
30 TABLET, EXTENDED RELEASE ORAL 2 TIMES DAILY
COMMUNITY

## 2018-12-06 SDOH — HEALTH STABILITY: MENTAL HEALTH: HOW OFTEN DO YOU HAVE A DRINK CONTAINING ALCOHOL?: NEVER

## 2018-12-07 ENCOUNTER — TELEPHONE (OUTPATIENT)
Dept: FAMILY MEDICINE CLINIC | Facility: CLINIC | Age: 53
End: 2018-12-07

## 2018-12-07 ENCOUNTER — TELEPHONE (OUTPATIENT)
Dept: CARDIOLOGY | Age: 53
End: 2018-12-07

## 2018-12-07 DIAGNOSIS — I73.9 PVD (PERIPHERAL VASCULAR DISEASE) (CMD): Primary | ICD-10-CM

## 2018-12-07 LAB
INTERNATIONAL NORMALIZED RATIO: 1 (ref 1.8–3.5)
PROTHROMBIN TIME, THERAPEUTIC: 10 S (ref 9.5–11.5)

## 2018-12-07 ASSESSMENT — ENCOUNTER SYMPTOMS
ENDOCRINE NEGATIVE: 1
NUMBNESS: 1
ALLERGIC/IMMUNOLOGIC NEGATIVE: 1
GASTROINTESTINAL NEGATIVE: 1
EYES NEGATIVE: 1
CONSTITUTIONAL NEGATIVE: 1
RESPIRATORY NEGATIVE: 1
HEMATOLOGIC/LYMPHATIC NEGATIVE: 1
PSYCHIATRIC NEGATIVE: 1

## 2018-12-07 NOTE — TELEPHONE ENCOUNTER
Called bita JONES Rn- and she advised that when they review the medications now possible interactions will pop up and they have to make the Dr aware in case they want to make changes:     Lisinopril/Bactrim- increase potassium level  Insulin/Coreg- cause

## 2018-12-07 NOTE — TELEPHONE ENCOUNTER
ADMITTED PT TO HOME HEALTH TODAY, AID ONCE A WEEK. WOUND CARE, EVERYTHING SEEMS FINE. PLEASE LET THEM KNOW IF  HAS ANY OTHER ORDERS. MED LIST IS SHOWING DRUG INTERACTIONS.   PLEASE ADVISE

## 2018-12-10 ENCOUNTER — TELEPHONE (OUTPATIENT)
Dept: NEPHROLOGY | Age: 53
End: 2018-12-10

## 2018-12-10 LAB
FINAL REPORT: NORMAL
FINAL REPORT: NORMAL

## 2018-12-11 ENCOUNTER — TELEPHONE (OUTPATIENT)
Dept: PULMONOLOGY | Age: 53
End: 2018-12-11

## 2018-12-11 ENCOUNTER — OFFICE VISIT (OUTPATIENT)
Dept: PULMONOLOGY | Age: 53
End: 2018-12-11
Attending: INTERNAL MEDICINE

## 2018-12-11 VITALS — HEIGHT: 72 IN | BODY MASS INDEX: 39.68 KG/M2 | WEIGHT: 293 LBS

## 2018-12-11 DIAGNOSIS — R06.02 SHORTNESS OF BREATH: Primary | ICD-10-CM

## 2018-12-11 DIAGNOSIS — R06.02 SOB (SHORTNESS OF BREATH): ICD-10-CM

## 2018-12-11 DIAGNOSIS — G47.33 OSA (OBSTRUCTIVE SLEEP APNEA): Primary | ICD-10-CM

## 2018-12-11 DIAGNOSIS — R09.02 HYPOXIA: ICD-10-CM

## 2018-12-11 SDOH — HEALTH STABILITY: MENTAL HEALTH: HOW OFTEN DO YOU HAVE A DRINK CONTAINING ALCOHOL?: NEVER

## 2018-12-13 ENCOUNTER — TELEPHONE (OUTPATIENT)
Dept: FAMILY MEDICINE CLINIC | Facility: CLINIC | Age: 53
End: 2018-12-13

## 2018-12-13 DIAGNOSIS — E11.22 CHRONIC KIDNEY DISEASE WITH END STAGE RENAL DISEASE ON DIALYSIS DUE TO TYPE 2 DIABETES MELLITUS (HCC): ICD-10-CM

## 2018-12-13 DIAGNOSIS — Z51.89 ENCOUNTER FOR WOUND CARE: Primary | ICD-10-CM

## 2018-12-13 DIAGNOSIS — N18.6 CHRONIC KIDNEY DISEASE WITH END STAGE RENAL DISEASE ON DIALYSIS DUE TO TYPE 2 DIABETES MELLITUS (HCC): ICD-10-CM

## 2018-12-13 DIAGNOSIS — Z99.2 CHRONIC KIDNEY DISEASE WITH END STAGE RENAL DISEASE ON DIALYSIS DUE TO TYPE 2 DIABETES MELLITUS (HCC): ICD-10-CM

## 2018-12-13 DIAGNOSIS — I10 ESSENTIAL HYPERTENSION: ICD-10-CM

## 2018-12-13 DIAGNOSIS — E66.9 DIABETES MELLITUS TYPE 2 IN OBESE (HCC): ICD-10-CM

## 2018-12-13 DIAGNOSIS — E11.69 DIABETES MELLITUS TYPE 2 IN OBESE (HCC): ICD-10-CM

## 2018-12-13 DIAGNOSIS — R09.89 PULMONARY CONGESTION: ICD-10-CM

## 2018-12-13 NOTE — TELEPHONE ENCOUNTER
Do you want me to put a referral in for Residential.     She has had problems with all [facility] agencies

## 2018-12-13 NOTE — TELEPHONE ENCOUNTER
Jocelyn Flores returned your call. Yohana Cordero would like to know what services you are looking for in the [place] area.  Currently, PT and nursing are available and she has and email out to someone in her company to get the full list of services that may be available.

## 2018-12-13 NOTE — TELEPHONE ENCOUNTER
Libia Jung - she advised that they do not really have staffing for the [facility]. Patient currently has [facility]   She has used [facility]  [facility] will no longer see the patient    Maybe [facility]?    Calling the patient- left message

## 2018-12-13 NOTE — TELEPHONE ENCOUNTER
PT IS ASKING IF [facility] CAN RECOMMEND A [address] [place] [address] Way. SHE CURRENTLY HAS [facility]. THEY HAVE NOT BEEN RELIABLE.  PLEASE CALL

## 2018-12-13 NOTE — TELEPHONE ENCOUNTER
----- Message from Carissa Barcenas sent at 12/13/2018 11:40 AM CST -----  PT RETURNING PAOLO'S CALL. PLEASE CB.

## 2018-12-13 NOTE — TELEPHONE ENCOUNTER
Wound care on her big toe- she sees podiatry  She wanted to see Dr Adriana Arnold- I advised that he will be out of the office until next Thursday   Next Friday she is going into the hospital- she will be admitted for a few nights.    She will be going in for an

## 2018-12-14 ENCOUNTER — TELEPHONE (OUTPATIENT)
Dept: FAMILY MEDICINE CLINIC | Facility: CLINIC | Age: 53
End: 2018-12-14

## 2018-12-14 NOTE — TELEPHONE ENCOUNTER
Calling to advise that I spoke with them this morning and they are supposed to get back to me.    I will let her know once I hear back from them

## 2018-12-14 NOTE — TELEPHONE ENCOUNTER
I called the number for the [facility] that I have and it is disconnected     Calling the [place] office- [phone]  They are relocating their [place] office and the phone has not been connected yet     [facility]

## 2018-12-17 NOTE — TELEPHONE ENCOUNTER
----- Message from Wayne Luna sent at 12/17/2018 10:23 AM CST -----  Contact: PT  CALLING THE NURSE BACK, CALL HER BACK

## 2018-12-17 NOTE — TELEPHONE ENCOUNTER
----- Message from You Rubalcava sent at 12/17/2018  2:43 PM CST -----  Contact: [address]-   They have accepted/approved the referral for home health services.

## 2018-12-18 ENCOUNTER — TELEPHONE (OUTPATIENT)
Dept: FAMILY MEDICINE CLINIC | Facility: CLINIC | Age: 53
End: 2018-12-18

## 2018-12-19 ENCOUNTER — OFFICE VISIT (OUTPATIENT)
Dept: PODIATRY | Age: 53
End: 2018-12-19

## 2018-12-19 DIAGNOSIS — I73.9 PVD (PERIPHERAL VASCULAR DISEASE) (CMD): ICD-10-CM

## 2018-12-19 DIAGNOSIS — L97.522 ULCER OF GREAT TOE, LEFT, WITH FAT LAYER EXPOSED (CMD): Primary | ICD-10-CM

## 2018-12-19 PROCEDURE — 99213 OFFICE O/P EST LOW 20 MIN: CPT | Performed by: PODIATRIST

## 2018-12-19 SDOH — HEALTH STABILITY: MENTAL HEALTH: HOW OFTEN DO YOU HAVE A DRINK CONTAINING ALCOHOL?: NEVER

## 2018-12-20 ENCOUNTER — TELEPHONE (OUTPATIENT)
Dept: CARDIOLOGY | Age: 53
End: 2018-12-20

## 2018-12-21 ENCOUNTER — TELEPHONE (OUTPATIENT)
Dept: CARDIOLOGY | Age: 53
End: 2018-12-21

## 2018-12-21 DIAGNOSIS — I73.9 PERIPHERAL VASCULAR DISEASE (CMD): ICD-10-CM

## 2018-12-21 DIAGNOSIS — Z79.4 DIABETES MELLITUS TYPE 2, INSULIN DEPENDENT (CMD): ICD-10-CM

## 2018-12-21 DIAGNOSIS — Z01.810 PREOP CARDIOVASCULAR EXAM: Primary | ICD-10-CM

## 2018-12-21 DIAGNOSIS — E11.9 DIABETES MELLITUS TYPE 2, INSULIN DEPENDENT (CMD): ICD-10-CM

## 2018-12-21 LAB
ACT LOW RANGE, POC: 164 SECONDS
GLUCOSE, POC: 197 MG/DL

## 2018-12-25 PROBLEM — L97.522: Status: ACTIVE | Noted: 2018-12-25

## 2018-12-28 ENCOUNTER — TELEPHONE (OUTPATIENT)
Dept: PULMONOLOGY | Age: 53
End: 2018-12-28

## 2018-12-28 DIAGNOSIS — R06.02 SOB (SHORTNESS OF BREATH): ICD-10-CM

## 2018-12-28 DIAGNOSIS — R09.02 HYPOXIA: Primary | ICD-10-CM

## 2018-12-31 RX ORDER — CARVEDILOL 12.5 MG/1
TABLET ORAL
Qty: 60 TABLET | Refills: 3 | Status: SHIPPED | OUTPATIENT
Start: 2018-12-31

## 2018-12-31 NOTE — TELEPHONE ENCOUNTER
Last office visit:  10/04/18  Last cmp:  10/21/18  Last bp:  10/04/18   134/70  Last refill:  Don't see anywhere we have filled this. No future appointments.

## 2019-01-01 ENCOUNTER — APPOINTMENT (OUTPATIENT)
Dept: PODIATRY | Age: 54
End: 2019-01-01

## 2019-01-01 ENCOUNTER — TELEPHONE (OUTPATIENT)
Dept: FAMILY MEDICINE CLINIC | Facility: CLINIC | Age: 54
End: 2019-01-01

## 2019-01-01 ENCOUNTER — TELEPHONE (OUTPATIENT)
Dept: CARDIOLOGY | Age: 54
End: 2019-01-01

## 2019-01-01 ENCOUNTER — OFFICE VISIT (OUTPATIENT)
Dept: PODIATRY | Age: 54
End: 2019-01-01

## 2019-01-01 ENCOUNTER — MED REC SCAN ONLY (OUTPATIENT)
Dept: FAMILY MEDICINE CLINIC | Facility: CLINIC | Age: 54
End: 2019-01-01

## 2019-01-01 ENCOUNTER — IMAGING SERVICES (OUTPATIENT)
Dept: GENERAL RADIOLOGY | Age: 54
End: 2019-01-01
Attending: INTERNAL MEDICINE

## 2019-01-01 ENCOUNTER — OFFICE VISIT (OUTPATIENT)
Dept: PULMONOLOGY | Age: 54
End: 2019-01-01

## 2019-01-01 ENCOUNTER — IMAGING SERVICES (OUTPATIENT)
Dept: CT IMAGING | Age: 54
End: 2019-01-01
Attending: INTERNAL MEDICINE

## 2019-01-01 ENCOUNTER — APPOINTMENT (OUTPATIENT)
Dept: CT IMAGING | Age: 54
End: 2019-01-01
Attending: INTERNAL MEDICINE

## 2019-01-01 ENCOUNTER — TELEPHONE (OUTPATIENT)
Dept: PULMONOLOGY | Age: 54
End: 2019-01-01

## 2019-01-01 ENCOUNTER — EXTERNAL RECORD (OUTPATIENT)
Dept: OTHER | Age: 54
End: 2019-01-01

## 2019-01-01 ENCOUNTER — APPOINTMENT (OUTPATIENT)
Dept: CARDIOLOGY | Age: 54
End: 2019-01-01

## 2019-01-01 ENCOUNTER — APPOINTMENT (OUTPATIENT)
Dept: CARDIOLOGY | Age: 54
End: 2019-01-01
Attending: INTERNAL MEDICINE

## 2019-01-01 ENCOUNTER — OFFICE VISIT (OUTPATIENT)
Dept: FAMILY MEDICINE CLINIC | Facility: CLINIC | Age: 54
End: 2019-01-01
Payer: MEDICARE

## 2019-01-01 ENCOUNTER — APPOINTMENT (OUTPATIENT)
Dept: PULMONOLOGY | Age: 54
End: 2019-01-01

## 2019-01-01 ENCOUNTER — PATIENT OUTREACH (OUTPATIENT)
Dept: FAMILY MEDICINE CLINIC | Facility: CLINIC | Age: 54
End: 2019-01-01

## 2019-01-01 ENCOUNTER — IMAGING SERVICES (OUTPATIENT)
Dept: GENERAL RADIOLOGY | Age: 54
End: 2019-01-01
Attending: PODIATRIST

## 2019-01-01 ENCOUNTER — EXTERNAL RECORD (OUTPATIENT)
Dept: HEALTH INFORMATION MANAGEMENT | Facility: OTHER | Age: 54
End: 2019-01-01

## 2019-01-01 ENCOUNTER — APPOINTMENT (OUTPATIENT)
Dept: PET IMAGING | Age: 54
End: 2019-01-01
Attending: INTERNAL MEDICINE

## 2019-01-01 ENCOUNTER — OFFICE VISIT (OUTPATIENT)
Dept: CARDIOLOGY | Age: 54
End: 2019-01-01

## 2019-01-01 ENCOUNTER — EXTERNAL RECORD (OUTPATIENT)
Dept: CARDIOLOGY | Age: 54
End: 2019-01-01

## 2019-01-01 ENCOUNTER — EXTERNAL RECORD (OUTPATIENT)
Dept: NEPHROLOGY | Age: 54
End: 2019-01-01

## 2019-01-01 VITALS
WEIGHT: 280 LBS | DIASTOLIC BLOOD PRESSURE: 82 MMHG | BODY MASS INDEX: 39.2 KG/M2 | OXYGEN SATURATION: 92 % | RESPIRATION RATE: 18 BRPM | SYSTOLIC BLOOD PRESSURE: 128 MMHG | HEIGHT: 71 IN | HEART RATE: 83 BPM

## 2019-01-01 VITALS
OXYGEN SATURATION: 90 % | HEART RATE: 82 BPM | SYSTOLIC BLOOD PRESSURE: 124 MMHG | HEIGHT: 72 IN | WEIGHT: 274 LBS | DIASTOLIC BLOOD PRESSURE: 70 MMHG | BODY MASS INDEX: 37.11 KG/M2 | RESPIRATION RATE: 16 BRPM

## 2019-01-01 VITALS
SYSTOLIC BLOOD PRESSURE: 162 MMHG | HEART RATE: 84 BPM | DIASTOLIC BLOOD PRESSURE: 60 MMHG | TEMPERATURE: 99 F | OXYGEN SATURATION: 84 %

## 2019-01-01 VITALS
TEMPERATURE: 99.9 F | OXYGEN SATURATION: 86 % | DIASTOLIC BLOOD PRESSURE: 62 MMHG | WEIGHT: 278 LBS | HEIGHT: 72 IN | BODY MASS INDEX: 37.65 KG/M2 | SYSTOLIC BLOOD PRESSURE: 120 MMHG | RESPIRATION RATE: 16 BRPM | HEART RATE: 90 BPM

## 2019-01-01 VITALS
WEIGHT: 280 LBS | BODY MASS INDEX: 39.2 KG/M2 | SYSTOLIC BLOOD PRESSURE: 122 MMHG | TEMPERATURE: 99 F | DIASTOLIC BLOOD PRESSURE: 60 MMHG | HEART RATE: 78 BPM | HEIGHT: 71 IN | OXYGEN SATURATION: 90 %

## 2019-01-01 VITALS
OXYGEN SATURATION: 98 % | HEART RATE: 88 BPM | SYSTOLIC BLOOD PRESSURE: 118 MMHG | TEMPERATURE: 98 F | DIASTOLIC BLOOD PRESSURE: 60 MMHG

## 2019-01-01 DIAGNOSIS — I25.118 CORONARY ARTERY DISEASE OF NATIVE ARTERY OF NATIVE HEART WITH STABLE ANGINA PECTORIS (CMD): ICD-10-CM

## 2019-01-01 DIAGNOSIS — G47.33 OBSTRUCTIVE SLEEP APNEA: Primary | ICD-10-CM

## 2019-01-01 DIAGNOSIS — Z89.511 HISTORY OF BELOW-KNEE AMPUTATION OF RIGHT LOWER EXTREMITY (CMD): ICD-10-CM

## 2019-01-01 DIAGNOSIS — Z79.4 TYPE 2 DIABETES MELLITUS WITH DIABETIC NEUROPATHY, WITH LONG-TERM CURRENT USE OF INSULIN (CMD): ICD-10-CM

## 2019-01-01 DIAGNOSIS — E11.65 UNCONTROLLED TYPE II DIABETES MELLITUS WITH NEPHROPATHY (HCC): ICD-10-CM

## 2019-01-01 DIAGNOSIS — L97.522 ULCER OF GREAT TOE, LEFT, WITH FAT LAYER EXPOSED (CMD): Primary | ICD-10-CM

## 2019-01-01 DIAGNOSIS — B35.1 FUNGAL NAIL INFECTION: ICD-10-CM

## 2019-01-01 DIAGNOSIS — R06.02 SOB (SHORTNESS OF BREATH): Primary | ICD-10-CM

## 2019-01-01 DIAGNOSIS — N18.6 ESRD (END STAGE RENAL DISEASE) ON DIALYSIS (CMD): ICD-10-CM

## 2019-01-01 DIAGNOSIS — I73.9 PERIPHERAL VASCULAR DISEASE (CMD): ICD-10-CM

## 2019-01-01 DIAGNOSIS — I10 ESSENTIAL HYPERTENSION: ICD-10-CM

## 2019-01-01 DIAGNOSIS — J44.9 CHRONIC OBSTRUCTIVE PULMONARY DISEASE, UNSPECIFIED COPD TYPE (HCC): ICD-10-CM

## 2019-01-01 DIAGNOSIS — I25.5 ISCHEMIC CARDIOMYOPATHY: ICD-10-CM

## 2019-01-01 DIAGNOSIS — N18.6 ESRD ON HEMODIALYSIS (HCC): ICD-10-CM

## 2019-01-01 DIAGNOSIS — R93.89 ABNORMAL CHEST X-RAY: ICD-10-CM

## 2019-01-01 DIAGNOSIS — J43.9 PULMONARY EMPHYSEMA, UNSPECIFIED EMPHYSEMA TYPE (CMD): Primary | ICD-10-CM

## 2019-01-01 DIAGNOSIS — L97.522 SKIN ULCER OF LEFT GREAT TOE WITH FAT LAYER EXPOSED (CMD): Primary | ICD-10-CM

## 2019-01-01 DIAGNOSIS — I31.39 PERICARDIAL EFFUSION: Primary | ICD-10-CM

## 2019-01-01 DIAGNOSIS — L97.522 SKIN ULCER OF LEFT GREAT TOE WITH FAT LAYER EXPOSED (CMD): ICD-10-CM

## 2019-01-01 DIAGNOSIS — G47.33 OSA (OBSTRUCTIVE SLEEP APNEA): Primary | ICD-10-CM

## 2019-01-01 DIAGNOSIS — J44.9 CHRONIC OBSTRUCTIVE PULMONARY DISEASE, UNSPECIFIED COPD TYPE (CMD): Primary | ICD-10-CM

## 2019-01-01 DIAGNOSIS — G47.33 OSA (OBSTRUCTIVE SLEEP APNEA): ICD-10-CM

## 2019-01-01 DIAGNOSIS — I73.9 PAD (PERIPHERAL ARTERY DISEASE) (HCC): Primary | ICD-10-CM

## 2019-01-01 DIAGNOSIS — E11.21 UNCONTROLLED TYPE II DIABETES MELLITUS WITH NEPHROPATHY (HCC): ICD-10-CM

## 2019-01-01 DIAGNOSIS — I73.9 PVD (PERIPHERAL VASCULAR DISEASE) (CMD): ICD-10-CM

## 2019-01-01 DIAGNOSIS — R06.02 SHORTNESS OF BREATH: Primary | ICD-10-CM

## 2019-01-01 DIAGNOSIS — Z87.39 HISTORY OF OSTEOMYELITIS: ICD-10-CM

## 2019-01-01 DIAGNOSIS — H65.03 NON-RECURRENT ACUTE SEROUS OTITIS MEDIA OF BOTH EARS: Primary | ICD-10-CM

## 2019-01-01 DIAGNOSIS — I25.10 CORONARY ARTERY DISEASE INVOLVING NATIVE CORONARY ARTERY OF NATIVE HEART WITHOUT ANGINA PECTORIS: ICD-10-CM

## 2019-01-01 DIAGNOSIS — E11.40 TYPE 2 DIABETES MELLITUS WITH DIABETIC NEUROPATHY, WITH LONG-TERM CURRENT USE OF INSULIN (CMD): ICD-10-CM

## 2019-01-01 DIAGNOSIS — R09.89 ABNORMAL FINDING OF LUNG: Primary | ICD-10-CM

## 2019-01-01 DIAGNOSIS — L97.529 DIABETIC ULCER OF TOE OF LEFT FOOT ASSOCIATED WITH TYPE 1 DIABETES MELLITUS, UNSPECIFIED ULCER STAGE (CMD): ICD-10-CM

## 2019-01-01 DIAGNOSIS — R23.4 ESCHAR OF TOE: ICD-10-CM

## 2019-01-01 DIAGNOSIS — R91.8 OTHER NONSPECIFIC ABNORMAL FINDING OF LUNG FIELD: ICD-10-CM

## 2019-01-01 DIAGNOSIS — Z89.511 HX OF RIGHT BKA (HCC): ICD-10-CM

## 2019-01-01 DIAGNOSIS — L97.522 ULCER OF GREAT TOE, LEFT, WITH FAT LAYER EXPOSED (CMD): ICD-10-CM

## 2019-01-01 DIAGNOSIS — R06.02 SOB (SHORTNESS OF BREATH): ICD-10-CM

## 2019-01-01 DIAGNOSIS — S82.892D CLOSED FRACTURE OF LEFT ANKLE WITH ROUTINE HEALING, SUBSEQUENT ENCOUNTER: ICD-10-CM

## 2019-01-01 DIAGNOSIS — E10.621 DIABETIC ULCER OF TOE OF LEFT FOOT ASSOCIATED WITH TYPE 1 DIABETES MELLITUS, UNSPECIFIED ULCER STAGE (CMD): ICD-10-CM

## 2019-01-01 DIAGNOSIS — R09.02 HYPOXIA: ICD-10-CM

## 2019-01-01 DIAGNOSIS — Z89.511 HX OF RIGHT BKA (HCC): Primary | ICD-10-CM

## 2019-01-01 DIAGNOSIS — N18.6 CHRONIC KIDNEY DISEASE WITH END STAGE RENAL DISEASE ON DIALYSIS DUE TO TYPE 2 DIABETES MELLITUS (HCC): ICD-10-CM

## 2019-01-01 DIAGNOSIS — E11.22 CHRONIC KIDNEY DISEASE WITH END STAGE RENAL DISEASE ON DIALYSIS DUE TO TYPE 2 DIABETES MELLITUS (HCC): ICD-10-CM

## 2019-01-01 DIAGNOSIS — Z12.39 BREAST CANCER SCREENING: ICD-10-CM

## 2019-01-01 DIAGNOSIS — E78.2 MIXED HYPERLIPIDEMIA: ICD-10-CM

## 2019-01-01 DIAGNOSIS — R91.1 LUNG NODULE: Primary | ICD-10-CM

## 2019-01-01 DIAGNOSIS — Z99.2 CHRONIC KIDNEY DISEASE WITH END STAGE RENAL DISEASE ON DIALYSIS DUE TO TYPE 2 DIABETES MELLITUS (HCC): ICD-10-CM

## 2019-01-01 DIAGNOSIS — R91.1 LUNG NODULE: ICD-10-CM

## 2019-01-01 DIAGNOSIS — Z99.2 ESRD (END STAGE RENAL DISEASE) ON DIALYSIS (CMD): ICD-10-CM

## 2019-01-01 DIAGNOSIS — Z72.0 TOBACCO ABUSE: ICD-10-CM

## 2019-01-01 DIAGNOSIS — J94.8 PLEURAL MASS: ICD-10-CM

## 2019-01-01 DIAGNOSIS — Z99.2 ESRD ON HEMODIALYSIS (HCC): ICD-10-CM

## 2019-01-01 DIAGNOSIS — R93.89 ABNORMAL CHEST X-RAY: Primary | ICD-10-CM

## 2019-01-01 LAB — AMB EXT HGBA1C: 7.7 %

## 2019-01-01 PROCEDURE — 73660 X-RAY EXAM OF TOE(S): CPT | Performed by: RADIOLOGY

## 2019-01-01 PROCEDURE — 99214 OFFICE O/P EST MOD 30 MIN: CPT | Performed by: INTERNAL MEDICINE

## 2019-01-01 PROCEDURE — 99213 OFFICE O/P EST LOW 20 MIN: CPT | Performed by: PODIATRIST

## 2019-01-01 PROCEDURE — 71250 CT THORAX DX C-: CPT | Performed by: RADIOLOGY

## 2019-01-01 PROCEDURE — 99213 OFFICE O/P EST LOW 20 MIN: CPT | Performed by: FAMILY MEDICINE

## 2019-01-01 PROCEDURE — 90960 ESRD SRV 4 VISITS P MO 20+: CPT | Performed by: INTERNAL MEDICINE

## 2019-01-01 PROCEDURE — 11042 DBRDMT SUBQ TIS 1ST 20SQCM/<: CPT | Performed by: PODIATRIST

## 2019-01-01 PROCEDURE — 71260 CT THORAX DX C+: CPT | Performed by: RADIOLOGY

## 2019-01-01 PROCEDURE — 11720 DEBRIDE NAIL 1-5: CPT | Performed by: PODIATRIST

## 2019-01-01 PROCEDURE — 93000 ELECTROCARDIOGRAM COMPLETE: CPT | Performed by: NURSE PRACTITIONER

## 2019-01-01 PROCEDURE — 99222 1ST HOSP IP/OBS MODERATE 55: CPT | Performed by: INTERNAL MEDICINE

## 2019-01-01 PROCEDURE — 99214 OFFICE O/P EST MOD 30 MIN: CPT | Performed by: NURSE PRACTITIONER

## 2019-01-01 PROCEDURE — 71046 X-RAY EXAM CHEST 2 VIEWS: CPT | Performed by: RADIOLOGY

## 2019-01-01 PROCEDURE — 90961 ESRD SRV 2-3 VSTS P MO 20+: CPT | Performed by: INTERNAL MEDICINE

## 2019-01-01 PROCEDURE — 99214 OFFICE O/P EST MOD 30 MIN: CPT | Performed by: FAMILY MEDICINE

## 2019-01-01 PROCEDURE — 99232 SBSQ HOSP IP/OBS MODERATE 35: CPT | Performed by: INTERNAL MEDICINE

## 2019-01-01 PROCEDURE — 1111F DSCHRG MED/CURRENT MED MERGE: CPT | Performed by: FAMILY MEDICINE

## 2019-01-01 PROCEDURE — 93306 TTE W/DOPPLER COMPLETE: CPT | Performed by: INTERNAL MEDICINE

## 2019-01-01 RX ORDER — LANCETS
EACH MISCELLANEOUS
Qty: 3 BOX | Refills: 0 | Status: SHIPPED | OUTPATIENT
Start: 2019-01-01

## 2019-01-01 RX ORDER — PANTOPRAZOLE SODIUM 40 MG/1
TABLET, DELAYED RELEASE ORAL
Qty: 60 TABLET | Refills: 1 | Status: SHIPPED | OUTPATIENT
Start: 2019-01-01

## 2019-01-01 RX ORDER — ATORVASTATIN CALCIUM 80 MG/1
TABLET, FILM COATED ORAL
Qty: 90 TABLET | Refills: 0 | Status: SHIPPED | OUTPATIENT
Start: 2019-01-01

## 2019-01-01 RX ORDER — BLOOD SUGAR DIAGNOSTIC
STRIP MISCELLANEOUS
Qty: 200 STRIP | Refills: 11 | Status: SHIPPED | OUTPATIENT
Start: 2019-01-01

## 2019-01-01 RX ORDER — OFLOXACIN 3 MG/ML
5 SOLUTION AURICULAR (OTIC) 2 TIMES DAILY
Qty: 5 ML | Refills: 0 | Status: SHIPPED | OUTPATIENT
Start: 2019-01-01 | End: 2019-01-01 | Stop reason: ALTCHOICE

## 2019-01-01 RX ORDER — BLOOD SUGAR DIAGNOSTIC
STRIP MISCELLANEOUS
Qty: 200 STRIP | Refills: 0 | Status: SHIPPED | OUTPATIENT
Start: 2019-01-01 | End: 2019-01-01

## 2019-01-01 RX ORDER — LANCETS 30 GAUGE
EACH MISCELLANEOUS
Qty: 200 EACH | Refills: 0 | Status: SHIPPED | OUTPATIENT
Start: 2019-01-01 | End: 2019-01-01

## 2019-01-01 RX ORDER — FLUOXETINE HYDROCHLORIDE 20 MG/1
20 CAPSULE ORAL
COMMUNITY
Start: 2019-04-05

## 2019-01-01 RX ORDER — SACCHAROMYCES BOULARDII 250 MG
250 CAPSULE ORAL 3 TIMES DAILY
COMMUNITY

## 2019-01-01 RX ORDER — CEFPROZIL 250 MG/1
250 TABLET, FILM COATED ORAL 2 TIMES DAILY
Qty: 20 TABLET | Refills: 0 | Status: SHIPPED | OUTPATIENT
Start: 2019-01-01 | End: 2019-01-01 | Stop reason: ALTCHOICE

## 2019-01-01 RX ORDER — NIFEDIPINE 60 MG/1
60 TABLET, FILM COATED, EXTENDED RELEASE ORAL DAILY
Qty: 30 TABLET | Refills: 0 | Status: SHIPPED | OUTPATIENT
Start: 2019-01-01

## 2019-01-01 RX ORDER — CETIRIZINE HYDROCHLORIDE 10 MG/1
10 TABLET ORAL DAILY
COMMUNITY

## 2019-01-01 RX ORDER — ONDANSETRON HYDROCHLORIDE 8 MG/1
8 TABLET, FILM COATED ORAL
COMMUNITY
Start: 2019-03-26

## 2019-01-01 RX ORDER — CITALOPRAM 20 MG/1
20 TABLET ORAL
COMMUNITY
Start: 2019-01-01

## 2019-01-01 RX ORDER — NITROGLYCERIN 0.6 MG/1
TABLET SUBLINGUAL
Qty: 100 TABLET | Refills: 0 | Status: SHIPPED | OUTPATIENT
Start: 2019-01-01

## 2019-01-01 RX ORDER — CEPHALEXIN 500 MG/1
1 CAPSULE ORAL EVERY 6 HOURS
Refills: 0 | COMMUNITY
Start: 2019-01-01 | End: 2020-01-01 | Stop reason: ALTCHOICE

## 2019-01-01 RX ORDER — LANCETS
EACH MISCELLANEOUS
Qty: 200 EACH | Refills: 0 | Status: SHIPPED | OUTPATIENT
Start: 2019-01-01 | End: 2019-01-01

## 2019-01-01 RX ORDER — ISOSORBIDE MONONITRATE 30 MG/1
30 TABLET, EXTENDED RELEASE ORAL 2 TIMES DAILY
Refills: 0 | COMMUNITY
Start: 2019-01-01

## 2019-01-01 RX ORDER — TICAGRELOR 90 MG/1
TABLET ORAL
Qty: 180 TABLET | Refills: 3 | Status: SHIPPED | OUTPATIENT
Start: 2019-01-01

## 2019-01-01 RX ORDER — CILOSTAZOL 100 MG/1
100 TABLET ORAL 2 TIMES DAILY
Qty: 60 TABLET | Refills: 5 | Status: SHIPPED | OUTPATIENT
Start: 2019-01-01

## 2019-01-01 RX ORDER — NITROGLYCERIN 0.6 MG/1
TABLET SUBLINGUAL
COMMUNITY
Start: 2019-01-01

## 2019-01-01 RX ORDER — TRAMADOL HYDROCHLORIDE 50 MG/1
50 TABLET ORAL 3 TIMES DAILY PRN
Refills: 0 | COMMUNITY
Start: 2019-01-01 | End: 2020-01-01 | Stop reason: ALTCHOICE

## 2019-01-01 SDOH — HEALTH STABILITY: MENTAL HEALTH: HOW OFTEN DO YOU HAVE A DRINK CONTAINING ALCOHOL?: NEVER

## 2019-01-01 ASSESSMENT — ENCOUNTER SYMPTOMS
CONSTITUTIONAL NEGATIVE: 1
NUMBNESS: 1
CONSTITUTIONAL NEGATIVE: 1
PSYCHIATRIC NEGATIVE: 1
EYES NEGATIVE: 1
GASTROINTESTINAL NEGATIVE: 1
RESPIRATORY NEGATIVE: 1
NUMBNESS: 1
ALLERGIC/IMMUNOLOGIC NEGATIVE: 1
ENDOCRINE NEGATIVE: 1
GASTROINTESTINAL NEGATIVE: 1
GASTROINTESTINAL NEGATIVE: 1
ALLERGIC/IMMUNOLOGIC NEGATIVE: 1
ENDOCRINE NEGATIVE: 1
RESPIRATORY NEGATIVE: 1
ENDOCRINE NEGATIVE: 1
PSYCHIATRIC NEGATIVE: 1
HEMATOLOGIC/LYMPHATIC NEGATIVE: 1
CONSTITUTIONAL NEGATIVE: 1
HEMATOLOGIC/LYMPHATIC NEGATIVE: 1
RESPIRATORY NEGATIVE: 1
EYES NEGATIVE: 1
PSYCHIATRIC NEGATIVE: 1
ALLERGIC/IMMUNOLOGIC NEGATIVE: 1
HEMATOLOGIC/LYMPHATIC NEGATIVE: 1
EYES NEGATIVE: 1
NUMBNESS: 1

## 2019-01-01 ASSESSMENT — PAIN SCALES - GENERAL
PAINLEVEL: 0
PAINLEVEL: 0

## 2019-01-09 ENCOUNTER — OFFICE VISIT (OUTPATIENT)
Dept: PULMONOLOGY | Age: 54
End: 2019-01-09

## 2019-01-09 ENCOUNTER — OFFICE VISIT (OUTPATIENT)
Dept: PODIATRY | Age: 54
End: 2019-01-09

## 2019-01-09 VITALS — BODY MASS INDEX: 41.02 KG/M2 | HEIGHT: 71 IN | WEIGHT: 293 LBS

## 2019-01-09 DIAGNOSIS — L97.522 SKIN ULCER OF LEFT GREAT TOE WITH FAT LAYER EXPOSED (CMD): ICD-10-CM

## 2019-01-09 DIAGNOSIS — R09.02 HYPOXIA: Primary | ICD-10-CM

## 2019-01-09 DIAGNOSIS — I73.9 PVD (PERIPHERAL VASCULAR DISEASE) (CMD): ICD-10-CM

## 2019-01-09 DIAGNOSIS — R06.02 SOB (SHORTNESS OF BREATH): ICD-10-CM

## 2019-01-09 DIAGNOSIS — R23.4 ESCHAR OF TOE: Primary | ICD-10-CM

## 2019-01-09 PROCEDURE — 94727 GAS DIL/WSHOT DETER LNG VOL: CPT

## 2019-01-09 PROCEDURE — 94060 EVALUATION OF WHEEZING: CPT

## 2019-01-09 PROCEDURE — 94729 DIFFUSING CAPACITY: CPT

## 2019-01-09 PROCEDURE — 99213 OFFICE O/P EST LOW 20 MIN: CPT | Performed by: PODIATRIST

## 2019-01-11 ENCOUNTER — OFFICE VISIT (OUTPATIENT)
Dept: FAMILY MEDICINE CLINIC | Facility: CLINIC | Age: 54
End: 2019-01-11
Payer: MEDICARE

## 2019-01-11 ENCOUNTER — LAB ENCOUNTER (OUTPATIENT)
Dept: LAB | Age: 54
End: 2019-01-11
Attending: FAMILY MEDICINE
Payer: MEDICARE

## 2019-01-11 VITALS
TEMPERATURE: 97 F | SYSTOLIC BLOOD PRESSURE: 130 MMHG | OXYGEN SATURATION: 90 % | HEART RATE: 82 BPM | DIASTOLIC BLOOD PRESSURE: 72 MMHG

## 2019-01-11 DIAGNOSIS — I10 ESSENTIAL HYPERTENSION: ICD-10-CM

## 2019-01-11 DIAGNOSIS — E66.9 DIABETES MELLITUS TYPE 2 IN OBESE (HCC): ICD-10-CM

## 2019-01-11 DIAGNOSIS — I50.22 CHRONIC SYSTOLIC CONGESTIVE HEART FAILURE (HCC): ICD-10-CM

## 2019-01-11 DIAGNOSIS — I73.9 PAD (PERIPHERAL ARTERY DISEASE) (HCC): ICD-10-CM

## 2019-01-11 DIAGNOSIS — E11.69 DIABETES MELLITUS TYPE 2 IN OBESE (HCC): ICD-10-CM

## 2019-01-11 DIAGNOSIS — N18.6 ESRD ON HEMODIALYSIS (HCC): ICD-10-CM

## 2019-01-11 DIAGNOSIS — D68.9 CLOTTING DISORDER (HCC): ICD-10-CM

## 2019-01-11 DIAGNOSIS — Z89.511 HX OF RIGHT BKA (HCC): ICD-10-CM

## 2019-01-11 DIAGNOSIS — I25.10 CORONARY ARTERY DISEASE INVOLVING NATIVE CORONARY ARTERY OF NATIVE HEART WITHOUT ANGINA PECTORIS: ICD-10-CM

## 2019-01-11 DIAGNOSIS — Z99.2 ESRD ON HEMODIALYSIS (HCC): ICD-10-CM

## 2019-01-11 DIAGNOSIS — J44.9 CHRONIC OBSTRUCTIVE PULMONARY DISEASE, UNSPECIFIED COPD TYPE (HCC): ICD-10-CM

## 2019-01-11 DIAGNOSIS — I10 ESSENTIAL HYPERTENSION: Primary | ICD-10-CM

## 2019-01-11 LAB
CHOLEST SMN-MCNC: 225 MG/DL (ref ?–200)
EST. AVERAGE GLUCOSE BLD GHB EST-MCNC: 275 MG/DL (ref 68–126)
HBA1C MFR BLD HPLC: 11.2 % (ref ?–5.7)
HDLC SERPL-MCNC: 32 MG/DL (ref 40–59)
LDLC SERPL DIRECT ASSAY-MCNC: 126 MG/DL (ref ?–100)
NONHDLC SERPL-MCNC: 193 MG/DL (ref ?–130)
TRIGL SERPL-MCNC: 616 MG/DL (ref 30–149)

## 2019-01-11 PROCEDURE — 36415 COLL VENOUS BLD VENIPUNCTURE: CPT

## 2019-01-11 PROCEDURE — 83721 ASSAY OF BLOOD LIPOPROTEIN: CPT

## 2019-01-11 PROCEDURE — 36415 COLL VENOUS BLD VENIPUNCTURE: CPT | Performed by: FAMILY MEDICINE

## 2019-01-11 PROCEDURE — 99214 OFFICE O/P EST MOD 30 MIN: CPT | Performed by: FAMILY MEDICINE

## 2019-01-11 PROCEDURE — 80061 LIPID PANEL: CPT

## 2019-01-11 PROCEDURE — 83036 HEMOGLOBIN GLYCOSYLATED A1C: CPT

## 2019-01-11 RX ORDER — DOXYCYCLINE HYCLATE 100 MG
100 TABLET ORAL 2 TIMES DAILY
Qty: 20 TABLET | Refills: 0 | Status: SHIPPED | OUTPATIENT
Start: 2019-01-11 | End: 2019-03-20 | Stop reason: ALTCHOICE

## 2019-01-15 ENCOUNTER — TELEPHONE (OUTPATIENT)
Dept: FAMILY MEDICINE CLINIC | Facility: CLINIC | Age: 54
End: 2019-01-15

## 2019-01-15 NOTE — TELEPHONE ENCOUNTER
THEY KEEP GETTING THE POWER MOBILITY DEVICE EVAL. FORM BLANK. THE SECOND ? SHOULD STATE: WHEN ORDERING A POWER CHAIR (THIS IS THE CORRECT FORM). SHE SAID THIS FORM MUST BE COMPLETED. SHE IS ALSO ASKING IF PT. IS ON OXYGEN ALL THE TIME?   SHE FAXED THE FORM

## 2019-01-15 NOTE — TELEPHONE ENCOUNTER
Per the office notes that were included it states for a powered wheelchair     I called Ольга Fried and she advised that we have to fill out the form regarding the actual power wheelchair.    I will fill this out and fax back

## 2019-01-18 ENCOUNTER — PATIENT OUTREACH (OUTPATIENT)
Dept: FAMILY MEDICINE CLINIC | Facility: CLINIC | Age: 54
End: 2019-01-18

## 2019-01-18 NOTE — PROGRESS NOTES
SPOKE W/SUHA ABOUT SCHEDULING HER MEDICARE WELLNESS EXAM. SHE WILL CB WHEN SHE CAN PLAN TRANSPORTATION W/DAUGHTER.

## 2019-01-19 ENCOUNTER — DOCUMENTATION (OUTPATIENT)
Dept: CARDIOLOGY | Age: 54
End: 2019-01-19

## 2019-01-19 ASSESSMENT — ENCOUNTER SYMPTOMS
ALLERGIC/IMMUNOLOGIC NEGATIVE: 1
GASTROINTESTINAL NEGATIVE: 1
RESPIRATORY NEGATIVE: 1
ENDOCRINE NEGATIVE: 1
HEMATOLOGIC/LYMPHATIC NEGATIVE: 1
PSYCHIATRIC NEGATIVE: 1
CONSTITUTIONAL NEGATIVE: 1
NUMBNESS: 1
EYES NEGATIVE: 1

## 2019-01-21 ENCOUNTER — LAB SERVICES (OUTPATIENT)
Dept: LAB | Age: 54
End: 2019-01-21

## 2019-01-21 ENCOUNTER — TELEPHONE (OUTPATIENT)
Dept: FAMILY MEDICINE CLINIC | Facility: CLINIC | Age: 54
End: 2019-01-21

## 2019-01-21 DIAGNOSIS — I73.9 PERIPHERAL VASCULAR DISEASE (CMD): ICD-10-CM

## 2019-01-21 DIAGNOSIS — E11.9 DIABETES MELLITUS TYPE 2, INSULIN DEPENDENT (CMD): ICD-10-CM

## 2019-01-21 DIAGNOSIS — Z01.810 PREOP CARDIOVASCULAR EXAM: Primary | ICD-10-CM

## 2019-01-21 DIAGNOSIS — Z79.4 DIABETES MELLITUS TYPE 2, INSULIN DEPENDENT (CMD): ICD-10-CM

## 2019-01-21 LAB
BASOPHIL %: 0.2 % (ref 0–1.2)
BASOPHIL ABSOLUTE #: 0 10*3/UL (ref 0–0.1)
DIFFERENTIAL TYPE: ABNORMAL
EOSINOPHIL %: 1.4 % (ref 0–10)
EOSINOPHIL ABSOLUTE #: 0.1 10*3/UL (ref 0–0.5)
HEMATOCRIT: 32.4 % (ref 34–45)
HEMOGLOBIN: 10.7 G/DL (ref 11.2–15.7)
LYMPH PERCENT: 12.8 % (ref 20.5–51.1)
LYMPHOCYTE ABSOLUTE #: 1.1 10*3/UL (ref 1.2–3.4)
MEAN CORPUSCULAR HGB CONCENTRATION: 33 % (ref 32–36)
MEAN CORPUSCULAR HGB: 34.5 PG (ref 27–34)
MEAN CORPUSCULAR VOLUME: 104.5 FL (ref 79–95)
MEAN PLATELET VOLUME: 10.6 FL (ref 8.6–12.4)
MONOCYTE ABSOLUTE #: 0.6 10*3/UL (ref 0.2–0.9)
MONOCYTE PERCENT: 6.6 % (ref 4.3–12.9)
NEUTROPHIL ABSOLUTE #: 7 10*3/UL (ref 1.4–6.5)
NEUTROPHIL PERCENT: 79 % (ref 34–73.5)
PLATELET COUNT: 281 10*3/UL (ref 150–400)
RED BLOOD CELL COUNT: 3.1 10*6/UL (ref 3.7–5.2)
RED CELL DISTRIBUTION WIDTH: 13.6 % (ref 11.3–14.8)
WHITE BLOOD CELL COUNT: 8.8 10*3/UL (ref 4–10)

## 2019-01-21 PROCEDURE — 80048 BASIC METABOLIC PNL TOTAL CA: CPT | Performed by: INTERNAL MEDICINE

## 2019-01-21 PROCEDURE — 83735 ASSAY OF MAGNESIUM: CPT | Performed by: INTERNAL MEDICINE

## 2019-01-21 PROCEDURE — 83036 HEMOGLOBIN GLYCOSYLATED A1C: CPT | Performed by: INTERNAL MEDICINE

## 2019-01-21 PROCEDURE — 85610 PROTHROMBIN TIME: CPT | Performed by: INTERNAL MEDICINE

## 2019-01-21 PROCEDURE — 85025 COMPLETE CBC W/AUTO DIFF WBC: CPT | Performed by: INTERNAL MEDICINE

## 2019-01-21 PROCEDURE — 36415 COLL VENOUS BLD VENIPUNCTURE: CPT | Performed by: INTERNAL MEDICINE

## 2019-01-21 NOTE — TELEPHONE ENCOUNTER
[employer] CALLED TO CONFIRM THAT DR [facility] RECEIVED PPW FOR PT MOBILITY DEVICE THAT NEEDS TO BE COMPLETED BY DR [facility]. IT WAS FAXED ON EDGAR 15.  THEY NEED IT FAXED BACK AS SOON AS POSSIBLE PLEASE FAX TO:    FAX: [phone]

## 2019-01-22 ENCOUNTER — TELEPHONE (OUTPATIENT)
Dept: CARDIOLOGY | Age: 54
End: 2019-01-22

## 2019-01-22 ENCOUNTER — TELEPHONE (OUTPATIENT)
Dept: FAMILY MEDICINE CLINIC | Facility: CLINIC | Age: 54
End: 2019-01-22

## 2019-01-22 LAB
BUN SERPL-MCNC: 39 MG/DL (ref 7–20)
CALCIUM SERPL-MCNC: 9 MG/DL (ref 8.6–10.6)
CHLORIDE SERPL-SCNC: 93 MMOL/L (ref 96–107)
CO2 SERPL-SCNC: 32 MMOL/L (ref 22–32)
CREAT SERPL-MCNC: 4.3 MG/DL (ref 0.5–1.4)
EST. AVERAGE GLUCOSE BLD GHB EST-MCNC: 275 MG/DL (ref 0–154)
GFR SERPL CREATININE-BSD FRML MDRD: 11 ML/MIN/{1.73M2}
GFR SERPL CREATININE-BSD FRML MDRD: 13 ML/MIN/{1.73M2}
GLUCOSE SERPL-MCNC: 507 MG/DL (ref 70–200)
HBA1C BLD-MCNC: 11.2 % (ref 4.2–6)
INTERNATIONAL NORMALIZED RATIO: 0.9 (ref 1.8–3.5)
MAGNESIUM SERPL-MCNC: 2 MG/DL (ref 1.6–2.6)
POTASSIUM SERPL-SCNC: 4.6 MMOL/L (ref 3.5–5.3)
PROTHROMBIN TIME, THERAPEUTIC: 9.4 S (ref 9.5–11.5)
SODIUM SERPL-SCNC: 136 MMOL/L (ref 136–146)

## 2019-01-22 NOTE — TELEPHONE ENCOUNTER
----- Message from Chelsea Unger sent at 1/22/2019 10:52 AM CST -----  PT RETURNING NURSE CALL.  PLEASE CB

## 2019-01-22 NOTE — TELEPHONE ENCOUNTER
Calling the patient back with her lab results       Notify diabetes control is worse.  Cholesterol is also too high.   Question if taking the atorvastatin 80 mg daily.  Need to see blood sugars 2 hours after each meal and fasting every morning.  The only wa

## 2019-01-24 ENCOUNTER — IMAGING SERVICES (OUTPATIENT)
Dept: OTHER | Age: 54
End: 2019-01-24

## 2019-01-24 ENCOUNTER — TELEPHONE (OUTPATIENT)
Dept: CARDIOLOGY | Age: 54
End: 2019-01-24

## 2019-01-28 ENCOUNTER — OFF PREMISE (OUTPATIENT)
Dept: PULMONOLOGY | Age: 54
End: 2019-01-28

## 2019-01-28 ENCOUNTER — TELEPHONE (OUTPATIENT)
Dept: CARDIOLOGY | Age: 54
End: 2019-01-28

## 2019-01-28 DIAGNOSIS — G47.33 OSA (OBSTRUCTIVE SLEEP APNEA): ICD-10-CM

## 2019-01-28 PROCEDURE — 95811 POLYSOM 6/>YRS CPAP 4/> PARM: CPT | Performed by: INTERNAL MEDICINE

## 2019-01-29 DIAGNOSIS — G47.33 OSA (OBSTRUCTIVE SLEEP APNEA): ICD-10-CM

## 2019-01-29 LAB
ACT LOW RANGE, POC: 121 SECONDS
ACT LOW RANGE, POC: 167 SECONDS
ACT LOW RANGE, POC: 175 SECONDS
ACT LOW RANGE, POC: 210 SECONDS
GLUCOSE, POC: 140 MG/DL

## 2019-01-30 ENCOUNTER — EXTERNAL RECORD (OUTPATIENT)
Dept: CARDIOLOGY | Age: 54
End: 2019-01-30

## 2019-01-30 ENCOUNTER — TELEPHONE (OUTPATIENT)
Dept: CARDIOLOGY | Age: 54
End: 2019-01-30

## 2019-01-31 ENCOUNTER — TELEPHONE (OUTPATIENT)
Dept: FAMILY MEDICINE CLINIC | Facility: CLINIC | Age: 54
End: 2019-01-31

## 2019-01-31 NOTE — TELEPHONE ENCOUNTER
[facility] faxed a plan of care and face to face encounter form on 12/30, was it received?

## 2019-01-31 NOTE — TELEPHONE ENCOUNTER
Contacted [place], he is requesting copy of last office visit and care plan we received signed by Dr. Vanice Gaucher. Faxed to [phone].

## 2019-02-02 RX ORDER — SYRINGE AND NEEDLE,INSULIN,1ML 31GX15/64"
SYRINGE, EMPTY DISPOSABLE MISCELLANEOUS
Qty: 300 EACH | Refills: 1 | Status: SHIPPED | OUTPATIENT
Start: 2019-02-02 | End: 2019-03-20 | Stop reason: ALTCHOICE

## 2019-02-05 RX ORDER — PROMETHAZINE HYDROCHLORIDE 25 MG/1
TABLET ORAL
COMMUNITY
End: 2019-01-01 | Stop reason: ALTCHOICE

## 2019-02-05 RX ORDER — NIFEDIPINE 60 MG/1
TABLET, EXTENDED RELEASE ORAL
COMMUNITY
End: 2019-02-06 | Stop reason: CLARIF

## 2019-02-05 RX ORDER — DOCUSATE SODIUM 100 MG/1
CAPSULE, LIQUID FILLED ORAL
COMMUNITY
End: 2019-01-01 | Stop reason: SDUPTHER

## 2019-02-06 RX ORDER — NIFEDIPINE 60 MG/1
TABLET, FILM COATED, EXTENDED RELEASE ORAL
COMMUNITY

## 2019-02-07 ENCOUNTER — APPOINTMENT (OUTPATIENT)
Dept: CARDIOLOGY | Age: 54
End: 2019-02-07

## 2019-02-13 ENCOUNTER — TELEPHONE (OUTPATIENT)
Dept: FAMILY MEDICINE CLINIC | Facility: CLINIC | Age: 54
End: 2019-02-13

## 2019-02-13 NOTE — TELEPHONE ENCOUNTER
Calling to advise-   Just doing betadine on it; they are waiting for the toe to fall off- doing weekly checks   She just moved so they want her to get acclimated to her new surroundings, etc.

## 2019-02-18 ENCOUNTER — EXTERNAL RECORD (OUTPATIENT)
Dept: NEPHROLOGY | Age: 54
End: 2019-02-18

## 2019-02-19 RX ORDER — ATORVASTATIN CALCIUM 80 MG/1
80 TABLET, FILM COATED ORAL NIGHTLY
Qty: 30 TABLET | Refills: 3 | Status: SHIPPED | OUTPATIENT
Start: 2019-02-19 | End: 2019-01-01

## 2019-02-25 ENCOUNTER — TELEPHONE (OUTPATIENT)
Dept: PULMONOLOGY | Age: 54
End: 2019-02-25

## 2019-02-28 VITALS
DIASTOLIC BLOOD PRESSURE: 60 MMHG | HEIGHT: 68 IN | WEIGHT: 293 LBS | SYSTOLIC BLOOD PRESSURE: 162 MMHG | HEART RATE: 80 BPM | BODY MASS INDEX: 44.41 KG/M2

## 2019-03-05 VITALS
HEIGHT: 71 IN | WEIGHT: 293 LBS | BODY MASS INDEX: 41.02 KG/M2 | HEART RATE: 83 BPM | SYSTOLIC BLOOD PRESSURE: 132 MMHG | RESPIRATION RATE: 20 BRPM | OXYGEN SATURATION: 95 % | DIASTOLIC BLOOD PRESSURE: 76 MMHG

## 2019-03-05 VITALS
WEIGHT: 291 LBS | BODY MASS INDEX: 40.74 KG/M2 | DIASTOLIC BLOOD PRESSURE: 74 MMHG | HEART RATE: 70 BPM | HEIGHT: 71 IN | RESPIRATION RATE: 20 BRPM | SYSTOLIC BLOOD PRESSURE: 140 MMHG

## 2019-03-06 VITALS
RESPIRATION RATE: 16 BRPM | BODY MASS INDEX: 41.02 KG/M2 | WEIGHT: 293 LBS | OXYGEN SATURATION: 94 % | DIASTOLIC BLOOD PRESSURE: 70 MMHG | HEIGHT: 71 IN | SYSTOLIC BLOOD PRESSURE: 132 MMHG | HEART RATE: 68 BPM

## 2019-03-07 ENCOUNTER — APPOINTMENT (OUTPATIENT)
Dept: PULMONOLOGY | Age: 54
End: 2019-03-07

## 2019-03-07 ENCOUNTER — TELEPHONE (OUTPATIENT)
Dept: FAMILY MEDICINE CLINIC | Facility: CLINIC | Age: 54
End: 2019-03-07

## 2019-03-07 NOTE — TELEPHONE ENCOUNTER
Contacted patient (afterwards saw it was home health calling). States she has taken her furosemide and carvedilol as prescribed today. No symptoms including headache, chest pain, shortness of breath or blurred vision. Please review and advise.

## 2019-03-07 NOTE — TELEPHONE ENCOUNTER
Liz Crenshaw from [facility]. She states pt is currently taking Carvedilol 12.5mg bid, nifedipine ER 60mg daily, isosorbide ER 30 mg daily, Furosemide and lisinopril.  Pt is currently on dialysis Monday Wednesday Friday, scheduled for tomorrow

## 2019-03-20 ENCOUNTER — OFFICE VISIT (OUTPATIENT)
Dept: FAMILY MEDICINE CLINIC | Facility: CLINIC | Age: 54
End: 2019-03-20
Payer: MEDICARE

## 2019-03-20 VITALS
OXYGEN SATURATION: 85 % | HEART RATE: 91 BPM | TEMPERATURE: 100 F | DIASTOLIC BLOOD PRESSURE: 60 MMHG | SYSTOLIC BLOOD PRESSURE: 138 MMHG

## 2019-03-20 DIAGNOSIS — E11.69 DIABETES MELLITUS TYPE 2 IN OBESE (HCC): ICD-10-CM

## 2019-03-20 DIAGNOSIS — E86.0 DEHYDRATION: ICD-10-CM

## 2019-03-20 DIAGNOSIS — B34.9 VIRAL SYNDROME: Primary | ICD-10-CM

## 2019-03-20 DIAGNOSIS — E66.9 DIABETES MELLITUS TYPE 2 IN OBESE (HCC): ICD-10-CM

## 2019-03-20 PROCEDURE — 99214 OFFICE O/P EST MOD 30 MIN: CPT | Performed by: FAMILY MEDICINE

## 2019-03-20 RX ORDER — ONDANSETRON HYDROCHLORIDE 8 MG/1
8 TABLET, FILM COATED ORAL EVERY 8 HOURS PRN
Qty: 15 TABLET | Refills: 0 | Status: SHIPPED | OUTPATIENT
Start: 2019-03-20 | End: 2019-03-26

## 2019-03-20 NOTE — PROGRESS NOTES
Valentino Almas is a 49year old female. Patient presents with:   Other: feeling horrible, very bad cough, keeping pt up at night, sore throat, chest hurts, a lot of mucus with cough, chills, vomiting since saturday-cannot keep pills or food down, body ac daily. Disp:  Rfl:      No current facility-administered medications on file prior to visit.       Past Medical History:   Diagnosis Date   • Abscess     right LE stump at site of amputation, I/D by Dr Estrella Nolen, podiatry, 11/2015, 4 weeks of IV abx with In angioplasty of the left femoral-popliteal bypass at the ostium status post left cadaver by   • Peripheral vascular disease ([facility] [address]    • S/P coronary angiogram     November 2017, Pato Sweet. No significant change from November 2016.   Stent in the proximal LAD wh organomegaly or CVA tenderness.   EXTREMITIES: no edema          ASSESSMENT AND PLAN:     Viral syndrome  (primary encounter diagnosis)  Dehydration  Diabetes mellitus type 2 in obese (hcc)    Likely has influenza  Try zofran for nausea  Needs to try to imp

## 2019-03-25 ENCOUNTER — TELEPHONE (OUTPATIENT)
Dept: FAMILY MEDICINE CLINIC | Facility: CLINIC | Age: 54
End: 2019-03-25

## 2019-03-25 NOTE — TELEPHONE ENCOUNTER
CALLING ABOUT Ondansetron HCl (ZOFRAN) THAT WAS PRESCRIBED, THEY HAVE ON FILE PT IS ALLERGIC TO THIS, CALLING TO VERIFY

## 2019-03-25 NOTE — TELEPHONE ENCOUNTER
I called the patient and she does not have an allergy to this medication. She is still in the hospital and hopefully being d/c today.  She was diagnosed with pneumonia     She does not feel that she needs the zofran script anymore   I will let [facility] kno

## 2019-03-26 ENCOUNTER — TELEPHONE (OUTPATIENT)
Dept: FAMILY MEDICINE CLINIC | Facility: CLINIC | Age: 54
End: 2019-03-26

## 2019-03-26 RX ORDER — ONDANSETRON HYDROCHLORIDE 8 MG/1
8 TABLET, FILM COATED ORAL EVERY 8 HOURS PRN
Qty: 15 TABLET | Refills: 0 | Status: SHIPPED | OUTPATIENT
Start: 2019-03-26 | End: 2019-01-01 | Stop reason: ALTCHOICE

## 2019-03-26 NOTE — TELEPHONE ENCOUNTER
Kirti Vance from [address] wants to make sure that she is not allergic to this because they have not filled this before he said

## 2019-03-26 NOTE — TELEPHONE ENCOUNTER
Calling Gagan Devi-     She went and saw the patient today (patient was d/c yesterday from hospital). The patient is extremely nauseous, is supposed to be taking levaquin and flagyl and has not taken it because she is concerned that she will vomit.

## 2019-03-27 ENCOUNTER — TELEPHONE (OUTPATIENT)
Dept: PODIATRY | Age: 54
End: 2019-03-27

## 2019-04-05 NOTE — PROGRESS NOTES
Ritu Moy is a 49year old female. Patient presents with: Other: [address] release last monday. . er after discharge. .left foot swelling and numbness. . room 3      HPI:   Patient was hospitalized at [address] with pneumonia.   She feels better f 3350 Oral Powd Pack Take 17 g by mouth daily as needed. Disp:  Rfl:    furosemide 40 MG Oral Tab Take 1 tablet (40 mg total) by mouth 2 (two) times daily. Disp: 60 tablet Rfl: 1   RENVELA 800 MG Oral Tab Take 1,600 mg by mouth 3 (three) times daily.    Vianney Kennedy following from infectious disease   • PAD (peripheral artery disease) ([facility] [address]     Status post successful complex intervention of a chronically totally occluded popliteal artery as well as left SFA to the ostium, balloon angioplasty of the entire leg and depl denies headaches    EXAM:   /80   Pulse 78   Temp 98 °F (36.7 °C) (Temporal)   LMP 02/13/2013   SpO2 98%   GENERAL: well developed, well nourished,in no apparent distress  SKIN: no rashes,no suspicious lesions  HEENT: atraumatic, normocephalic, R TM

## 2019-04-22 PROBLEM — N18.6 ESRD (END STAGE RENAL DISEASE) ON DIALYSIS (CMD): Status: ACTIVE | Noted: 2019-01-01

## 2019-04-22 PROBLEM — Z99.2 ESRD (END STAGE RENAL DISEASE) ON DIALYSIS (CMD): Status: ACTIVE | Noted: 2019-01-01

## 2019-05-30 NOTE — PROGRESS NOTES
No future appointments. Left message for patient to return phone call. Upon return phone call will notify Dr. Kushal Feliz has reviewed her chart and is requesting she come in to have Hemoglobin A1C drawn within the next week.

## 2019-06-10 NOTE — PROGRESS NOTES
Patient was recently inpatient 6/2/19-6/4/19 (chest pain) @ [address] [facility] [address]   HgbA1c was not done.    No indication yet that she is receiving [facility]

## 2019-07-22 NOTE — TELEPHONE ENCOUNTER
Paperwork for the state to be filled out - patient needs to make a 30 minute appointment to fill out.  [facility])     Future Appointments   Date Time Provider Susana Sterling   7/24/2019  4:00 PM DO JADIEL LynSW [facility] Chin

## 2019-07-24 NOTE — PROGRESS NOTES
Malorie Hunter is a 49year old female. Patient presents with:  Paperwork: fill out paperwork. ..room 1  Diabetes: needs new meter. .. HPI:   Patient has been trying to get home health care.   She is having more more difficulty taking care of herself Insulin Pen Needle (BD PEN NEEDLE OSVALDO U/F) 32G X 4 MM Does not apply Misc Inject 5 pen into the skin 5 (five) times daily.  Dx: E11.21 Disp: 150 each Rfl: prn   [DISCONTINUED] Pantoprazole Sodium 40 MG Oral Tab EC Take 40 mg by mouth 2 (two) times daily chronically totally occluded popliteal artery as well as left SFA to the ostium, balloon angioplasty of the entire leg and deployment of ever flex stents in the proximal ostial SFA mid SFA distal SFA and in the popliteal June 2018.   Status post cutting bal SpO2 90%   BMI 39.05 kg/m²   GENERAL: well developed, well nourished,in no apparent distress  SKIN: no rashes,no suspicious lesions  HEENT: atraumatic, normocephalic, R TM normal, L TM normal, Pharynx normal  NECK: supple, no cervical adenopathy  LUNGS: cl

## 2019-07-25 NOTE — TELEPHONE ENCOUNTER
Attempted to call back, pharmacy closed, opens at [address] [place] [address]. Will retry then.

## 2019-07-31 NOTE — PROGRESS NOTES
Vicente Yu is a 49year old female. Patient presents with:  Ear Pain: bilateral ear pain-LT is worse then RT-started lastnight-taking tylenol. ...room 1      HPI:   Both ears feel plugged and are painful, left worse than right.   She is had head conge mouth nightly. Disp: 30 tablet Rfl: 3   CARVEDILOL 12.5 MG Oral Tab TAKE ONE TABLET BY MOUTH TWICE DAILY Disp: 60 tablet Rfl: 3   Sennosides-Docusate Sodium (DOC-Q-LAX) 8.6-50 MG Oral Tab Take 1 tablet by mouth 2 (two) times daily as needed.    Disp:  Rfl: Jude benavides did surgery 6/2014   • Hx of right BKA (HCC)    • Hyperlipidemia    • Ischemic dilated cardiomyopathy (HCC)     Ejection fraction of 45%   • Neuropathy    • Obesity, unspecified    • Osteomyelitis of right tibia [facility])     MRI at [facility] use: Not on file       REVIEW OF SYSTEMS:   GENERAL HEALTH: feels well otherwise  SKIN: denies any unusual skin lesions or rashes  RESPIRATORY: denies shortness of breath   CARDIOVASCULAR: denies chest pain   GI: denies nausea, vomiting, diarrhea or abdomi

## 2019-08-01 NOTE — TELEPHONE ENCOUNTER
Contacted patient. Can not find the paperwork at the desk. Do not see anything scanned in. Will ask Arabella Yuan RN tomorrow morning.

## 2019-08-05 NOTE — TELEPHONE ENCOUNTER
I called the patient to confirm that the paperwork is in her chart and I will refax everything this morning

## 2019-08-23 NOTE — TELEPHONE ENCOUNTER
Mammogram ordered 7/24/19. Patient has not scheduled. Please call patient to follow up.   (PSRs can schedule this for patient.),

## 2019-09-17 NOTE — PROGRESS NOTES
Valentino Almas is a 49year old female. Patient presents with: Other: discuss diabetes, meds, sleep apnea, anxiety      HPI:   Patient complains of anxiety. She is having difficulty sleeping at night.   She is having difficulty with her CPAP because sh as needed. Disp:  Rfl:    furosemide 40 MG Oral Tab Take 1 tablet (40 mg total) by mouth 2 (two) times daily. Disp: 60 tablet Rfl: 1   RENVELA 800 MG Oral Tab Take 1,600 mg by mouth 3 (three) times daily.    Disp:  Rfl:    BRILINTA 90 MG Oral Tab Take 90 ulcer     EGD November 2016 Danyel Linares, thought to be due to anti-inflammatories   • Essential hypertension    • History of DVT (deep vein thrombosis)     right upper arm approx 2010, hosp at [facility]   • Hx of BKA, right ([address]     2011   • Hx of necrotizing fas Smoking status: Former Smoker        Packs/day: 1.00        Years: 20.00        Pack years: 20        Types: Cigarettes        Quit date: 2016        Years since quittin.8      Smokeless tobacco: Never Used    Alcohol use: Not on file      Commen continue as prescribed. At least 25 minutes was spent with the patient. 15 of those 25 minutes was spent counseling about diagnostic tests, results, further tests, treatment options, risks and benefits as well as prognosis and expectations.       No orders

## 2019-09-30 PROBLEM — L97.529 DIABETIC ULCER OF TOE OF LEFT FOOT (CMD): Status: ACTIVE | Noted: 2019-03-21

## 2019-09-30 PROBLEM — E11.621 DIABETIC ULCER OF TOE OF LEFT FOOT (CMD): Status: ACTIVE | Noted: 2019-03-21

## 2019-09-30 PROBLEM — L97.522 SKIN ULCER OF LEFT GREAT TOE WITH FAT LAYER EXPOSED (CMD): Status: RESOLVED | Noted: 2019-01-09 | Resolved: 2019-01-01

## 2019-11-12 PROBLEM — G47.33 OSA (OBSTRUCTIVE SLEEP APNEA): Status: ACTIVE | Noted: 2019-01-01

## 2019-11-12 NOTE — PROGRESS NOTES
Matt Roman is a 49year old female. Patient presents with:  Hospital F/U: fup form [facility] for infection in her RT leg/stump--pt first went to [facility] and was OK'ed home then back to [facility] 2 days later. ...room 1 daily., Disp: , Rfl: 0  insulin glargine (LANTUS SOLOSTAR) 100 UNIT/ML Subcutaneous Solution Pen-injector, Inject 40 Units into the skin 2 (two) times daily.  Dx: E11.21, Disp: 39 mL, Rfl: 5  Insulin Pen Needle (BD PEN NEEDLE OSVALDO U/F) 32G X 4 MM Does not a angioplasty with a drug-eluting stent in December 2009, status post PCI to the LAD with 2 drug-eluting stents diagonal with one drug-eluting stent in July 2016 and subsequent angioplasty of the ostial/proximal LAD with a drug-eluting stent to the mid LAD w collaterals from the left coronary system unchanged from November 2016   • Secondary hyperparathyroidism of renal origin [facility])    • TIA (transient ischemic attack)    • Uncontrolled type II diabetes mellitus with nephropathy ([facility] [address] 11/30/2016     Past Olivia Montano diabetes mellitus (hcc)      I would like her to see Dr. Ronell Brittle. I am not sure that anything else can be done from a vascular standpoint but I would like his opinion. She may need a repeat angiogram.  We have a long discussion about the diabetes.   U

## 2019-11-13 NOTE — TELEPHONE ENCOUNTER
I called Dr Dhiraj Tello office, spoke with Ed Gallardo. The patient is scheduled for 12/3/19 10:00am up at the heart [facility]       Is this too far out?

## 2019-11-13 NOTE — TELEPHONE ENCOUNTER
Patient needs an appointment to see Dr Gabriella Saleem- Cardiovascular surgeon for PAD  Referral has been placed

## 2019-12-03 NOTE — TELEPHONE ENCOUNTER
Sheila Collier from Dr Taya Holden office was calling to let you know that Whittier Peers had an appt set up for today with Dr Opal Zhu but she go upset and left due to the wait. Sheila Collier was calling to let you know what happened and say that she is sorry.   Sheila Collier tried to call L

## 2019-12-14 NOTE — TELEPHONE ENCOUNTER
[facility] SAID TO CALL PCP REGARDING PRIOR AUTHORIZATION FAXED 4 DAYS AGO FOR TEST STRIPS.

## 2019-12-14 NOTE — TELEPHONE ENCOUNTER
I called [facility] directly and spoke with Ghislaine.  He advised that we just need to resend with the patient diagnosis on the script     Resending the script

## 2019-12-19 NOTE — TELEPHONE ENCOUNTER
Pt. Asking if Dr. Alverto Pierce has received any info from [address] re: hospital bed?

## 2019-12-19 NOTE — TELEPHONE ENCOUNTER
Looked on Dr. Teresita Landry desk. I don't see anything in the chart. Do you recall seeing anything come through? Please advise.

## 2020-01-01 ENCOUNTER — TELEPHONE (OUTPATIENT)
Dept: FAMILY MEDICINE CLINIC | Facility: CLINIC | Age: 55
End: 2020-01-01

## 2020-01-01 ENCOUNTER — OFFICE VISIT (OUTPATIENT)
Dept: FAMILY MEDICINE CLINIC | Facility: CLINIC | Age: 55
End: 2020-01-01
Payer: MEDICARE

## 2020-01-01 ENCOUNTER — OFFICE VISIT (OUTPATIENT)
Dept: PODIATRY | Age: 55
End: 2020-01-01

## 2020-01-01 ENCOUNTER — TELEPHONE (OUTPATIENT)
Dept: PULMONOLOGY | Age: 55
End: 2020-01-01

## 2020-01-01 ENCOUNTER — APPOINTMENT (OUTPATIENT)
Dept: CARDIOLOGY | Age: 55
End: 2020-01-01

## 2020-01-01 ENCOUNTER — MED REC SCAN ONLY (OUTPATIENT)
Dept: FAMILY MEDICINE CLINIC | Facility: CLINIC | Age: 55
End: 2020-01-01

## 2020-01-01 ENCOUNTER — IMAGING SERVICES (OUTPATIENT)
Dept: GENERAL RADIOLOGY | Age: 55
End: 2020-01-01
Attending: INTERNAL MEDICINE

## 2020-01-01 ENCOUNTER — EXTERNAL RECORD (OUTPATIENT)
Dept: OTHER | Age: 55
End: 2020-01-01

## 2020-01-01 ENCOUNTER — OFFICE VISIT (OUTPATIENT)
Dept: PULMONOLOGY | Age: 55
End: 2020-01-01

## 2020-01-01 ENCOUNTER — APPOINTMENT (OUTPATIENT)
Dept: PULMONOLOGY | Age: 55
End: 2020-01-01

## 2020-01-01 VITALS
DIASTOLIC BLOOD PRESSURE: 70 MMHG | TEMPERATURE: 97 F | SYSTOLIC BLOOD PRESSURE: 138 MMHG | HEART RATE: 80 BPM | OXYGEN SATURATION: 90 %

## 2020-01-01 VITALS
OXYGEN SATURATION: 77 % | HEART RATE: 80 BPM | TEMPERATURE: 97 F | DIASTOLIC BLOOD PRESSURE: 70 MMHG | SYSTOLIC BLOOD PRESSURE: 130 MMHG

## 2020-01-01 VITALS
TEMPERATURE: 97 F | HEART RATE: 76 BPM | OXYGEN SATURATION: 94 % | SYSTOLIC BLOOD PRESSURE: 124 MMHG | DIASTOLIC BLOOD PRESSURE: 60 MMHG

## 2020-01-01 VITALS — HEART RATE: 78 BPM | DIASTOLIC BLOOD PRESSURE: 82 MMHG | OXYGEN SATURATION: 95 % | SYSTOLIC BLOOD PRESSURE: 134 MMHG

## 2020-01-01 DIAGNOSIS — L89.311 PRESSURE INJURY OF RIGHT BUTTOCK, STAGE 1: ICD-10-CM

## 2020-01-01 DIAGNOSIS — Z89.511 HISTORY OF BELOW-KNEE AMPUTATION OF RIGHT LOWER EXTREMITY (CMD): ICD-10-CM

## 2020-01-01 DIAGNOSIS — J44.9 CHRONIC OBSTRUCTIVE PULMONARY DISEASE, UNSPECIFIED COPD TYPE (CMD): ICD-10-CM

## 2020-01-01 DIAGNOSIS — E11.65 UNCONTROLLED TYPE II DIABETES MELLITUS WITH NEPHROPATHY (HCC): ICD-10-CM

## 2020-01-01 DIAGNOSIS — E11.40 TYPE 2 DIABETES MELLITUS WITH DIABETIC NEUROPATHY, WITH LONG-TERM CURRENT USE OF INSULIN (CMD): ICD-10-CM

## 2020-01-01 DIAGNOSIS — N18.6 CHRONIC KIDNEY DISEASE WITH END STAGE RENAL DISEASE ON DIALYSIS DUE TO TYPE 2 DIABETES MELLITUS (HCC): ICD-10-CM

## 2020-01-01 DIAGNOSIS — E11.22 CHRONIC KIDNEY DISEASE WITH END STAGE RENAL DISEASE ON DIALYSIS DUE TO TYPE 2 DIABETES MELLITUS (HCC): ICD-10-CM

## 2020-01-01 DIAGNOSIS — J44.9 CHRONIC OBSTRUCTIVE PULMONARY DISEASE, UNSPECIFIED COPD TYPE (HCC): ICD-10-CM

## 2020-01-01 DIAGNOSIS — G47.33 OSA (OBSTRUCTIVE SLEEP APNEA): Primary | ICD-10-CM

## 2020-01-01 DIAGNOSIS — J42 CHRONIC BRONCHITIS, UNSPECIFIED CHRONIC BRONCHITIS TYPE (CMD): ICD-10-CM

## 2020-01-01 DIAGNOSIS — B02.8 HERPES ZOSTER WITH COMPLICATION: Primary | ICD-10-CM

## 2020-01-01 DIAGNOSIS — B35.1 FUNGAL NAIL INFECTION: ICD-10-CM

## 2020-01-01 DIAGNOSIS — B35.4 TINEA CORPORIS: ICD-10-CM

## 2020-01-01 DIAGNOSIS — D68.9 CLOTTING DISORDER (HCC): ICD-10-CM

## 2020-01-01 DIAGNOSIS — Z89.511 HX OF RIGHT BKA (HCC): ICD-10-CM

## 2020-01-01 DIAGNOSIS — G47.33 OBSTRUCTIVE SLEEP APNEA (ADULT) (PEDIATRIC): Primary | ICD-10-CM

## 2020-01-01 DIAGNOSIS — L97.522 SKIN ULCER OF LEFT GREAT TOE WITH FAT LAYER EXPOSED (CMD): Primary | ICD-10-CM

## 2020-01-01 DIAGNOSIS — E11.21 UNCONTROLLED TYPE II DIABETES MELLITUS WITH NEPHROPATHY (HCC): ICD-10-CM

## 2020-01-01 DIAGNOSIS — Z79.4 TYPE 2 DIABETES MELLITUS WITH DIABETIC NEUROPATHY, WITH LONG-TERM CURRENT USE OF INSULIN (CMD): ICD-10-CM

## 2020-01-01 DIAGNOSIS — I73.9 PERIPHERAL VASCULAR DISEASE (CMD): ICD-10-CM

## 2020-01-01 DIAGNOSIS — L89.311 PRESSURE INJURY OF RIGHT BUTTOCK, STAGE 1: Primary | ICD-10-CM

## 2020-01-01 DIAGNOSIS — Z99.2 CHRONIC KIDNEY DISEASE WITH END STAGE RENAL DISEASE ON DIALYSIS DUE TO TYPE 2 DIABETES MELLITUS (HCC): ICD-10-CM

## 2020-01-01 DIAGNOSIS — H69.81 DYSFUNCTION OF RIGHT EUSTACHIAN TUBE: Primary | ICD-10-CM

## 2020-01-01 LAB
SARS-COV-2 RNA SPEC QL NAA+PROBE: NOT DETECTED
SPECIMEN SOURCE: NORMAL

## 2020-01-01 PROCEDURE — 90960 ESRD SRV 4 VISITS P MO 20+: CPT | Performed by: INTERNAL MEDICINE

## 2020-01-01 PROCEDURE — 99223 1ST HOSP IP/OBS HIGH 75: CPT | Performed by: INTERNAL MEDICINE

## 2020-01-01 PROCEDURE — 99232 SBSQ HOSP IP/OBS MODERATE 35: CPT | Performed by: INTERNAL MEDICINE

## 2020-01-01 PROCEDURE — 99233 SBSQ HOSP IP/OBS HIGH 50: CPT | Performed by: INTERNAL MEDICINE

## 2020-01-01 PROCEDURE — 93306 TTE W/DOPPLER COMPLETE: CPT | Performed by: INTERNAL MEDICINE

## 2020-01-01 PROCEDURE — 99213 OFFICE O/P EST LOW 20 MIN: CPT | Performed by: FAMILY MEDICINE

## 2020-01-01 PROCEDURE — 99214 OFFICE O/P EST MOD 30 MIN: CPT | Performed by: FAMILY MEDICINE

## 2020-01-01 PROCEDURE — 90935 HEMODIALYSIS ONE EVALUATION: CPT | Performed by: INTERNAL MEDICINE

## 2020-01-01 PROCEDURE — 71046 X-RAY EXAM CHEST 2 VIEWS: CPT | Performed by: RADIOLOGY

## 2020-01-01 PROCEDURE — 90961 ESRD SRV 2-3 VSTS P MO 20+: CPT | Performed by: INTERNAL MEDICINE

## 2020-01-01 PROCEDURE — 99213 OFFICE O/P EST LOW 20 MIN: CPT | Performed by: PODIATRIST

## 2020-01-01 PROCEDURE — 99214 OFFICE O/P EST MOD 30 MIN: CPT | Performed by: INTERNAL MEDICINE

## 2020-01-01 PROCEDURE — 11042 DBRDMT SUBQ TIS 1ST 20SQCM/<: CPT | Performed by: PODIATRIST

## 2020-01-01 RX ORDER — FLUTICASONE FUROATE AND VILANTEROL 100; 25 UG/1; UG/1
1 POWDER RESPIRATORY (INHALATION) DAILY
Qty: 1 EACH | Refills: 11 | Status: SHIPPED | OUTPATIENT
Start: 2020-01-01 | End: 2020-01-01

## 2020-01-01 RX ORDER — ACYCLOVIR 50 MG/G
1 OINTMENT TOPICAL
Qty: 30 G | Refills: 0 | Status: SHIPPED | OUTPATIENT
Start: 2020-01-01 | End: 2020-01-01 | Stop reason: ALTCHOICE

## 2020-01-01 RX ORDER — ALBUTEROL SULFATE 90 UG/1
2 AEROSOL, METERED RESPIRATORY (INHALATION) EVERY 4 HOURS PRN
Qty: 1 INHALER | Refills: 5 | Status: SHIPPED | OUTPATIENT
Start: 2020-01-01

## 2020-01-01 RX ORDER — HYDROCODONE BITARTRATE AND ACETAMINOPHEN 5; 325 MG/1; MG/1
1 TABLET ORAL EVERY 4 HOURS PRN
Qty: 20 TABLET | Refills: 0 | Status: SHIPPED | OUTPATIENT
Start: 2020-01-01 | End: 2020-01-01 | Stop reason: ALTCHOICE

## 2020-01-01 RX ORDER — FLUTICASONE FUROATE AND VILANTEROL 100; 25 UG/1; UG/1
1 POWDER RESPIRATORY (INHALATION) DAILY
Qty: 1 EACH | Refills: 11 | Status: SHIPPED | OUTPATIENT
Start: 2020-01-01 | End: 2020-01-01 | Stop reason: SDUPTHER

## 2020-01-01 RX ORDER — SULFAMETHOXAZOLE AND TRIMETHOPRIM 800; 160 MG/1; MG/1
1 TABLET ORAL 2 TIMES DAILY
Qty: 20 TABLET | Refills: 0 | Status: SHIPPED | OUTPATIENT
Start: 2020-01-01 | End: 2020-01-01 | Stop reason: ALTCHOICE

## 2020-01-01 RX ORDER — ACYCLOVIR 50 MG/G
1 OINTMENT TOPICAL
Qty: 30 G | Refills: 0 | Status: SHIPPED | OUTPATIENT
Start: 2020-01-01 | End: 2020-01-01

## 2020-01-01 RX ORDER — CLOTRIMAZOLE AND BETAMETHASONE DIPROPIONATE 10; .64 MG/G; MG/G
1 CREAM TOPICAL 2 TIMES DAILY PRN
Qty: 60 G | Refills: 3 | Status: SHIPPED | OUTPATIENT
Start: 2020-01-01

## 2020-01-01 RX ORDER — ONDANSETRON HYDROCHLORIDE 8 MG/1
8 TABLET, FILM COATED ORAL EVERY 8 HOURS PRN
Qty: 15 TABLET | Refills: 0 | Status: SHIPPED | OUTPATIENT
Start: 2020-01-01

## 2020-01-01 RX ORDER — LEVOFLOXACIN 500 MG/1
500 TABLET, FILM COATED ORAL DAILY
Qty: 10 TABLET | Refills: 0 | Status: SHIPPED | OUTPATIENT
Start: 2020-01-01 | End: 2020-01-01

## 2020-01-01 RX ORDER — ALBUTEROL SULFATE 90 UG/1
2 AEROSOL, METERED RESPIRATORY (INHALATION) EVERY 4 HOURS PRN
Qty: 1 INHALER | Refills: 5 | Status: SHIPPED | OUTPATIENT
Start: 2020-01-01 | End: 2020-01-01 | Stop reason: SDUPTHER

## 2020-01-01 SDOH — HEALTH STABILITY: MENTAL HEALTH: HOW OFTEN DO YOU HAVE A DRINK CONTAINING ALCOHOL?: NEVER

## 2020-01-07 NOTE — PROGRESS NOTES
Kaia Medina is a 49year old female. Patient presents with:  ER F/U: fup from [facility]) ER on 12/31/19 for shingles? ? on buttocks pt was told by the nurses that it is infact bed sores. ...room 1      HPI:   Patient was seen at the ER in [place] Disp: 60 tablet, Rfl: 3  Sennosides-Docusate Sodium (DOC-Q-LAX) 8.6-50 MG Oral Tab, Take 1 tablet by mouth 2 (two) times daily as needed. , Disp: , Rfl:   furosemide 40 MG Oral Tab, Take 1 tablet (40 mg total) by mouth 2 (two) times daily. , Disp: 60 table thrombosis ([facility] [address]    • Depression    • Dialysis patient [facility])    • Esophageal reflux    • Esophageal ulcer     EGD November 2016 Donna Raman, thought to be due to anti-inflammatories   • Essential hypertension    • History of DVT (deep vein thrombosis)     right suicide        Social History:  Social History    Tobacco Use      Smoking status: Former Smoker        Packs/day: 1.00        Years: 20.00        Pack years: 20        Types: Cigarettes        Quit date: 11/20/2016        Years since quitting: 3.1 types were placed in this encounter.       Meds & Refills for this Visit:  Requested Prescriptions     Signed Prescriptions Disp Refills   • HYDROcodone-acetaminophen (NORCO) 5-325 MG Oral Tab 20 tablet 0     Sig: Take 1 tablet by mouth every 4 (four) hours

## 2020-01-07 NOTE — TELEPHONE ENCOUNTER
Pt. Elia Lopez she is waiting to get the cream from [facility] and they are telling her they are waiting for our call back. They are asking where the cream is being used and how much and how long?

## 2020-01-09 NOTE — TELEPHONE ENCOUNTER
Pt. Has questions on meds she just picked up. Pt. Chris Bhatt the medication she just picked up is for genital warts and herpes. She is confused.

## 2020-01-09 NOTE — TELEPHONE ENCOUNTER
Spoke with patient and informed her that shingles is part of the herpes virus. She verbalized understanding.

## 2020-01-14 PROBLEM — D68.9 CLOTTING DISORDER (HCC): Status: ACTIVE | Noted: 2020-01-01

## 2020-01-14 PROBLEM — J44.9 CHRONIC OBSTRUCTIVE PULMONARY DISEASE (HCC): Status: ACTIVE | Noted: 2020-01-01

## 2020-01-14 NOTE — PROGRESS NOTES
Inna Noble is a 49year old female. Patient presents with:  Shingles: fup on shingles. ...room 2      HPI:   Patient has shingles in the maira-anal area. The pain is much better. No fever. No chills.   HYDROcodone-acetaminophen (NORCO) 5-325 MG Oral TAKE ONE TABLET BY MOUTH TWICE DAILY, Disp: 60 tablet, Rfl: 3  Sennosides-Docusate Sodium (DOC-Q-LAX) 8.6-50 MG Oral Tab, Take 1 tablet by mouth 2 (two) times daily as needed.   , Disp: , Rfl:   furosemide 40 MG Oral Tab, Take 1 tablet (40 mg total) by mout disease [facility])    • Deep vein thrombosis ([facility] [address]    • Depression    • Dialysis patient [facility])    • Esophageal reflux    • Esophageal ulcer     EGD November 2016 Tanner, thought to be due to anti-inflammatories   • Essential hypertension    • History of DVT (de • Psychiatric Brother         suicide        Social History:  Social History    Tobacco Use      Smoking status: Former Smoker        Packs/day: 1.00        Years: 20.00        Pack years: 20        Types: Cigarettes        Quit date: 11/20/2016        Mike Chamberlain encounter.       Meds & Refills for this Visit:  Requested Prescriptions      No prescriptions requested or ordered in this encounter       Imaging & Consults:  None

## 2020-02-03 PROBLEM — R07.9 CHEST PAIN: Status: RESOLVED | Noted: 2018-10-19 | Resolved: 2020-01-01

## 2020-02-03 NOTE — PROGRESS NOTES
Keshia Erwin is a 49year old female. Patient presents with:  Wound: RECHECK WOUND ON [address] SAID IT IS GETTING BIGGER  Ear Pain: ALSO RT EAR PAIN-THAT STARTED TODAY. ... ROOM 1      HPI:   Patient complains of pressure in her right skin 5 (five) times daily.  Dx: E11.21, Disp: 150 each, Rfl: prn  CARVEDILOL 12.5 MG Oral Tab, TAKE ONE TABLET BY MOUTH TWICE DAILY, Disp: 60 tablet, Rfl: 3  Sennosides-Docusate Sodium (DOC-Q-LAX) 8.6-50 MG Oral Tab, Take 1 tablet by mouth 2 (two) times jackie anti-inflammatories   • Essential hypertension    • History of DVT (deep vein thrombosis)     right upper arm approx 2010, hosp at [facility]   • Hx of BKA, right ([address]     2011   • Hx of necrotizing fasciitis     right groin, Jude did surgery 6/2014   • Hyp years: 20        Types: Cigarettes        Quit date: 11/20/2016        Years since quitting: 3.2      Smokeless tobacco: Never Used    Alcohol use: Not on file      Comment: rare    Drug use: Not on file       REVIEW OF SYSTEMS:   GENERAL HEALTH: feels wel

## 2020-02-27 NOTE — TELEPHONE ENCOUNTER
Please see note below. Last refill 30g on 1/7/2020  Last office visit on 2/3/2020  No future appointments.

## 2020-02-27 NOTE — TELEPHONE ENCOUNTER
Acyclovir ointment    Gonzalo carr           [facility] state pt was complaining of SOB  Checked lungs left clear right diminished.     A respatory doctor is coming to see Davey Lux just wanted to give dr Santy Flores an Girish Garcias

## 2020-02-28 NOTE — TELEPHONE ENCOUNTER
Received fax from [address] [place] [address] to clarify script for acyclovir. Pt should d/c medication per Dr. Shaquille Babcock written orders    [facility] pharmacist advised.

## 2020-03-03 NOTE — PROGRESS NOTES
Vicente Yu is a 49year old female. Patient presents with:  Wound: left side, pt says its near her butt. HPI:   Patient has a pressure sore to her left buttock. It is been there for several months.   Her caregiver was worried that it might be 5  Insulin Pen Needle (BD PEN NEEDLE OSVALDO U/F) 32G X 4 MM Does not apply Misc, Inject 5 pen into the skin 5 (five) times daily.  Dx: E11.21, Disp: 150 each, Rfl: prn  CARVEDILOL 12.5 MG Oral Tab, TAKE ONE TABLET BY MOUTH TWICE DAILY, Disp: 60 tablet, Rfl: 3 2011   • Hx of necrotizing fasciitis     right groin, [place] did surgery 6/2014   • Hyperlipidemia    • Ischemic dilated cardiomyopathy (HCC)     Ejection fraction of 45%   • Neuropathy    • Obesity, unspecified    • Osteomyelitis of right tibia ([facility] [address] Comment: rare    Drug use: Not on file       REVIEW OF SYSTEMS:   GENERAL HEALTH: feels well otherwise  RESPIRATORY: denies shortness of breath   CARDIOVASCULAR: denies chest pain   GI: denies nausea, vomiting, diarrhea or abdominal pain   NEURO: denies he

## 2020-03-17 NOTE — TELEPHONE ENCOUNTER
Pt reports nonproductive cough, \"head cold\", runny nose, nasal congestion, sore throat. She is also nauseous and unable to keep anything down. Symptoms have been ongoing x3days. Has not taken any OTC meds d/t med hx.  Would like to know what she can ta

## 2020-03-17 NOTE — TELEPHONE ENCOUNTER
Please call in Zofran 8 mg 1 tablet up to 3 times a day as needed for nausea and vomiting, #15. Please also start her on Levaquin 500 mg once a day for the next 10 days. If her symptoms get any worse, she is got a need to go to the emergency room.

## 2020-03-17 NOTE — TELEPHONE ENCOUNTER
Advised home health RN that recommendations have already been given to pt this AM. Scripts have been sent and pt was advised to go to nearest ER if symptoms worsen.

## 2020-03-17 NOTE — TELEPHONE ENCOUNTER
NAUSEA, /50, C/O GUSHING NOSEBLEED YESTERDAY, DRY COUGH, DIF. BREATHING, TIRED, NO TEMP. Vish 88 TO SPEAK TO THE NURSE. STEPHENIE IS NOT WITH PT NOW.   SUHA IS BEYOND STRESSED

## 2020-04-08 ENCOUNTER — TELEPHONE (OUTPATIENT)
Dept: CARDIOLOGY | Age: 55
End: 2020-04-08

## 2020-04-30 ENCOUNTER — APPOINTMENT (OUTPATIENT)
Dept: PULMONOLOGY | Age: 55
End: 2020-04-30

## 2020-06-04 ENCOUNTER — APPOINTMENT (OUTPATIENT)
Dept: PODIATRY | Age: 55
End: 2020-06-04

## 2020-06-09 ENCOUNTER — APPOINTMENT (OUTPATIENT)
Dept: CARDIOLOGY | Age: 55
End: 2020-06-09

## 2021-05-25 VITALS
DIASTOLIC BLOOD PRESSURE: 68 MMHG | HEIGHT: 72 IN | BODY MASS INDEX: 37.93 KG/M2 | SYSTOLIC BLOOD PRESSURE: 132 MMHG | WEIGHT: 274 LBS | DIASTOLIC BLOOD PRESSURE: 78 MMHG | RESPIRATION RATE: 16 BRPM | SYSTOLIC BLOOD PRESSURE: 136 MMHG | OXYGEN SATURATION: 91 % | RESPIRATION RATE: 16 BRPM | HEIGHT: 72 IN | WEIGHT: 280 LBS | HEART RATE: 81 BPM | TEMPERATURE: 98.8 F | HEART RATE: 91 BPM | OXYGEN SATURATION: 91 % | BODY MASS INDEX: 37.11 KG/M2 | TEMPERATURE: 98.9 F

## 2022-01-01 NOTE — TELEPHONE ENCOUNTER
NEED REFERRAL FOR NEW CARDIOLOGIST, WHOEVER  [facility] RECOMMENDS OUT OF [facility], ALSO NEED REFERRAL FOR NEW PULMONARY DR AS WELL, SHE IS UNHAPPY WITH WHO SHE CURRENTLY SEES 0

## 2024-02-24 NOTE — TELEPHONE ENCOUNTER
Calling the patient- left message     Called [facility] and spoke with Lita. They are advising that for the [place] area they will not be able to provide services at this time. I will let the patient know.  I can try [facility]- patient is ok with 17-May-2023

## (undated) NOTE — MR AVS SNAPSHOT
Antonio Carranza  [address] [address]  [phone]               Thank you for choosing us for your health care visit with Frankie Avila DO.   We are glad to serve you and happy to provide you with this summ Generic drug:  Insulin Lispro   Inject 20 Units into the skin 3 (three) times daily before meals.            HYDROcodone-acetaminophen  MG Tabs   Commonly known as:  NORCO           insulin glargine 100 UNIT/ML Soln   Inject 40 Units into the skin pineda Today's Orders     Lipid Panel [E]    Complete by:  Jan 04, 2017 (Approximate)        Comp Metabolic Panel (14) [E]    Complete by:  Jan 04, 2017 (Approximate)        Microalb/Creat Ratio, Random Urine [E]    Complete by:  Jan 04, 2017              Comfort Ribeiro ? Make sure carpeting is secure. FLOORS:  ? Remove all loose wires, cords and throw rugs. ? Keep floors clear of clutter. ? Make sure carpets and area rugs have skid proof backing. ? Do not use slippery wax on bare floors. ?  Keep furniture in its accu

## (undated) NOTE — MR AVS SNAPSHOT
[facility]  [address] [address]  [phone]               Thank you for choosing us for your health care visit with Linh Mcmahon DO.   We are glad to serve you and happy to provide you with this summ insulin glargine 100 UNIT/ML Soln   Inject 40 Units into the skin every morning.    Commonly known as:  LANTUS           Insulin Syringe 29G X 1/2\" 1 ML Misc   Use up to 4 times daily   Commonly known as:  RELION INSULIN SYRINGE           Isosorbide [place] including white bread, rice and pasta   Eat plenty of protein, keep the fat content low Sugars:  sodas and sports drinks, candies and desserts   Eat plenty of low-fat dairy products High fat meats and dairy   Choose whole grain products Foods high in sodiu

## (undated) NOTE — MR AVS SNAPSHOT
[facility]  [address] [address]  [phone]               Thank you for choosing us for your health care visit with Neena Headley DO.   We are glad to serve you and happy to provide you with this summ Herman Khalil DO   [address] [address]   Phone:  [phone]   Fax:  [phone]    Diagnoses:  Uncontrolled type 2 diabetes mellitus with microalbuminuria, with long-term current use of insulin [facility])   Order:  Op Referral **REFERRAL REQUEST**    Your physician has referred you to a specialist.  Your physician or the clinic staff will provide you with the phone number you should call to schedule your appointment.      If you are confident that your benefit plan will TAKE ONE TABLET BY MOUTH ONCE DAILY   Commonly known as:  LASIX           Glucose Blood Strp   USE ONE  THREE TIMES DAILY   Commonly known as:  ONETOUCH ULTRA BLUE           HYDROcodone-acetaminophen  MG Tabs   Commonly known as:  Afsaneh Price           ins Commonly known as:  CARAFATE           * Notice: This list has 2 medication(s) that are the same as other medications prescribed for you. Read the directions carefully, and ask your doctor or other care provider to review them with you.             Jimi

## (undated) NOTE — LETTER
01/14/20          To Whom It May Concern,     Fadumo Vitale will no longer be contagious after 1/19/20.     Sincerely,    Madeline Power D.O., FAAFP